# Patient Record
Sex: MALE | Race: WHITE | NOT HISPANIC OR LATINO | Employment: OTHER | URBAN - NONMETROPOLITAN AREA
[De-identification: names, ages, dates, MRNs, and addresses within clinical notes are randomized per-mention and may not be internally consistent; named-entity substitution may affect disease eponyms.]

---

## 2018-03-22 ENCOUNTER — OUTSIDE FACILITY SERVICE (OUTPATIENT)
Dept: CARDIOLOGY | Facility: CLINIC | Age: 63
End: 2018-03-22

## 2018-03-22 PROCEDURE — 78452 HT MUSCLE IMAGE SPECT MULT: CPT | Performed by: INTERNAL MEDICINE

## 2018-03-22 PROCEDURE — 93018 CV STRESS TEST I&R ONLY: CPT | Performed by: INTERNAL MEDICINE

## 2020-01-14 ENCOUNTER — OUTSIDE FACILITY SERVICE (OUTPATIENT)
Dept: CARDIOLOGY | Facility: CLINIC | Age: 65
End: 2020-01-14

## 2020-01-14 PROCEDURE — 93018 CV STRESS TEST I&R ONLY: CPT | Performed by: INTERNAL MEDICINE

## 2020-01-14 PROCEDURE — 78452 HT MUSCLE IMAGE SPECT MULT: CPT | Performed by: INTERNAL MEDICINE

## 2021-01-19 ENCOUNTER — HOSPITAL ENCOUNTER (OUTPATIENT)
Dept: HOSPITAL 79 - LAB | Age: 66
End: 2021-01-19
Payer: MEDICARE

## 2021-01-19 DIAGNOSIS — N40.0: ICD-10-CM

## 2021-01-19 DIAGNOSIS — I10: ICD-10-CM

## 2021-01-19 DIAGNOSIS — E55.9: ICD-10-CM

## 2021-01-19 DIAGNOSIS — N39.0: ICD-10-CM

## 2021-01-19 DIAGNOSIS — Z12.5: Primary | ICD-10-CM

## 2021-01-19 DIAGNOSIS — R53.83: ICD-10-CM

## 2021-01-19 DIAGNOSIS — E78.5: ICD-10-CM

## 2021-01-19 DIAGNOSIS — I48.91: ICD-10-CM

## 2021-01-19 DIAGNOSIS — E11.9: ICD-10-CM

## 2021-01-19 DIAGNOSIS — N41.9: ICD-10-CM

## 2021-01-19 LAB
BUN/CREATININE RATIO: 13 (ref 0–10)
HGB BLD-MCNC: 13 GM/DL (ref 14–17.5)
RED BLOOD COUNT: 4.26 M/UL (ref 4.2–5.5)
WHITE BLOOD COUNT: 9 K/UL (ref 4.5–11)

## 2021-01-19 PROCEDURE — G0103 PSA SCREENING: HCPCS

## 2021-01-21 LAB
CREATININE, URINE: 312.7 MG/DL
MICROALB/CREAT RATIO: 20 (ref 0–29)
THYROXINE (T4): 5.4 UG/DL (ref 4.5–12)
TRIIODOTHYRONINE (T3): 93 NG/DL (ref 71–180)
VITAMIN D, 25-HYDROXY: 12.9 NG/ML (ref 30–100)

## 2021-01-25 ENCOUNTER — OFFICE VISIT (OUTPATIENT)
Dept: UROLOGY | Facility: CLINIC | Age: 66
End: 2021-01-25

## 2021-01-25 VITALS — TEMPERATURE: 98.2 F | BODY MASS INDEX: 27.2 KG/M2 | WEIGHT: 190 LBS | HEIGHT: 70 IN

## 2021-01-25 DIAGNOSIS — N41.0 ACUTE PROSTATITIS: Primary | ICD-10-CM

## 2021-01-25 DIAGNOSIS — R97.20 ELEVATED PROSTATE SPECIFIC ANTIGEN (PSA): ICD-10-CM

## 2021-01-25 PROCEDURE — 99203 OFFICE O/P NEW LOW 30 MIN: CPT | Performed by: UROLOGY

## 2021-01-25 RX ORDER — HYDROCHLOROTHIAZIDE 25 MG/1
12.5 TABLET ORAL DAILY
COMMUNITY

## 2021-01-25 RX ORDER — HYDROXYZINE HYDROCHLORIDE 10 MG/1
TABLET, FILM COATED ORAL
COMMUNITY
Start: 2020-11-01 | End: 2021-01-25

## 2021-01-25 RX ORDER — ALPRAZOLAM 0.5 MG/1
TABLET ORAL
COMMUNITY
Start: 2021-01-11 | End: 2021-01-25

## 2021-01-25 RX ORDER — MONTELUKAST SODIUM 10 MG/1
TABLET ORAL
COMMUNITY
Start: 2021-01-08 | End: 2021-01-25

## 2021-01-25 RX ORDER — LANSOPRAZOLE 30 MG/1
30 CAPSULE, DELAYED RELEASE ORAL DAILY
COMMUNITY
Start: 2020-12-04

## 2021-01-25 RX ORDER — CARVEDILOL 25 MG/1
25 TABLET ORAL 2 TIMES DAILY WITH MEALS
COMMUNITY
Start: 2020-11-21

## 2021-01-25 RX ORDER — PRAVASTATIN SODIUM 40 MG
40 TABLET ORAL DAILY
COMMUNITY

## 2021-01-25 RX ORDER — CITALOPRAM 40 MG/1
40 TABLET ORAL DAILY
COMMUNITY
Start: 2020-12-04

## 2021-01-25 RX ORDER — ERGOCALCIFEROL 1.25 MG/1
50000 CAPSULE ORAL WEEKLY
COMMUNITY

## 2021-01-25 RX ORDER — APIXABAN 5 MG/1
5 TABLET, FILM COATED ORAL EVERY 12 HOURS SCHEDULED
COMMUNITY
Start: 2021-01-20

## 2021-01-25 RX ORDER — ASPIRIN 81 MG/1
81 TABLET, CHEWABLE ORAL DAILY
COMMUNITY

## 2021-01-25 RX ORDER — HYDROCODONE BITARTRATE AND ACETAMINOPHEN 7.5; 325 MG/1; MG/1
1 TABLET ORAL EVERY 4 HOURS PRN
COMMUNITY
Start: 2020-12-11

## 2021-01-25 RX ORDER — TAMSULOSIN HYDROCHLORIDE 0.4 MG/1
1 CAPSULE ORAL DAILY
COMMUNITY
Start: 2021-01-20 | End: 2022-11-09 | Stop reason: SDUPTHER

## 2021-01-25 NOTE — PROGRESS NOTES
Chief Complaint:          Chief Complaint   Patient presents with   • Benign Prostatic Hypertrophy     New pt       HPI:   66 y.o. male referred for evaluation of an enlarged prostate he is currently on Cipro his PSA was 21.3 as checked on 1/19/2021.  This was at the time of a bad urinary tract infection with a pansensitive E. coli.  He gets up twice at night previously on Cipro.  I explained to him that checking a PSA in the face of his severe infection is actually not indicated and creates more problems and is worth.  Otherwise his labs are unremarkable his creatinine is 1.34 his white count is normal.  He has no other complaints or problems.  The PSA is dramatically elevated I will plan to check him back in 6 weeks after is completed a course of ciprofloxacin for what is probable prostatitis      Past Medical History:      History reviewed. No pertinent past medical history.      Current Meds:     Current Outpatient Medications   Medication Sig Dispense Refill   • aspirin 81 MG chewable tablet Chew 81 mg Daily.     • carvedilol (COREG) 25 MG tablet Take 25 mg by mouth 2 (Two) Times a Day With Meals.     • Ciprofloxacin (CIPRO PO) Take  by mouth Every Night.     • citalopram (CeleXA) 40 MG tablet Take 40 mg by mouth Daily.     • Eliquis 5 MG tablet tablet Take 5 mg by mouth Every 12 (Twelve) Hours.     • hydroCHLOROthiazide (HYDRODIURIL) 25 MG tablet Take 25 mg by mouth Daily.     • HYDROcodone-acetaminophen (NORCO) 7.5-325 MG per tablet Take 1 tablet by mouth Every 4 (Four) Hours As Needed.     • lansoprazole (PREVACID) 30 MG capsule Take 30 mg by mouth Daily.     • metFORMIN (GLUCOPHAGE) 1000 MG tablet Take 1,000 mg by mouth 2 (Two) Times a Day With Meals.     • pravastatin (PRAVACHOL) 40 MG tablet Take 40 mg by mouth Daily.     • tamsulosin (FLOMAX) 0.4 MG capsule 24 hr capsule Take 1 capsule by mouth Daily.     • vitamin D (ERGOCALCIFEROL) 1.25 MG (30086 UT) capsule capsule Take 50,000 Units by mouth 1 (One)  Time Per Week.       No current facility-administered medications for this visit.         Allergies:      Allergies   Allergen Reactions   • Contrast Dye Rash        Past Surgical History:     History reviewed. No pertinent surgical history.      Social History:     Social History     Socioeconomic History   • Marital status:      Spouse name: Not on file   • Number of children: Not on file   • Years of education: Not on file   • Highest education level: Not on file       Family History:     History reviewed. No pertinent family history.    Review of Systems:     Review of Systems   Constitutional: Negative.    HENT: Negative.    Eyes: Negative.    Respiratory: Negative.    Cardiovascular: Negative.    Gastrointestinal: Negative.    Endocrine: Negative.    Musculoskeletal: Negative.    Allergic/Immunologic: Negative.    Neurological: Negative.    Hematological: Negative.    Psychiatric/Behavioral: Negative.        Physical Exam:     Physical Exam  Vitals signs and nursing note reviewed.   Constitutional:       Appearance: He is well-developed.   HENT:      Head: Normocephalic and atraumatic.   Eyes:      Conjunctiva/sclera: Conjunctivae normal.      Pupils: Pupils are equal, round, and reactive to light.   Neck:      Musculoskeletal: Normal range of motion.   Cardiovascular:      Rate and Rhythm: Normal rate and regular rhythm.      Heart sounds: Normal heart sounds.   Pulmonary:      Effort: Pulmonary effort is normal.      Breath sounds: Normal breath sounds.   Abdominal:      General: Bowel sounds are normal.      Palpations: Abdomen is soft.   Genitourinary:     Comments: Soft nontender abdomen with no organomegaly, rigidity, or tenderness.  He has normal external genitalia and uncircumcised phallus with a freely movable foreskin bilaterally descended testes without masses there is no inguinal hernias adenopathy or abnormalities he had good rectal tone and a large smooth firm prostate.  There is no  nodularity or any suspicious rectal abnormalities    Musculoskeletal: Normal range of motion.   Skin:     General: Skin is warm and dry.   Neurological:      Mental Status: He is alert and oriented to person, place, and time.      Deep Tendon Reflexes: Reflexes are normal and symmetric.   Psychiatric:         Behavior: Behavior normal.         Thought Content: Thought content normal.         Judgment: Judgment normal.         I have reviewed the following portions of the patient's history: allergies, current medications, past family history, past medical history, past social history, past surgical history, problem list and ROS and confirm it's accurate.      Procedure:       Assessment/Plan:   Elevated prostate specific antigen-we discussed the diagnosis of elevated prostate-specific antigen.  I explained the pathophysiology of PSA.  It is a serine protease that's function in the male reproductive tract is to facilitate the liquefaction of semen.  It is for this reason the body does not want it freely floating in the serum and why typically bound tightly to albumin.  We discussed why we used both a PSA free and total to determine the need for more aggressive therapy I discussed the normal range.  Additionally it was in the range of 1-4 but more recently has been downgraded to something less than 2 or even approaching 1.  I discussed the risk of family history particularly the fact that the average male has a 14% risk of prostate cancer and that in the face of a positive diagnosis in a father it will tablet and any other first-generation relative continued tablet insofar that a father and brother with prostate cancer will produce almost a 50% risk of prostate cancer.  I discussed the use of the temporal use of PSA as the best option for monitoring.  We also discussed the fact that an elevated PSA is an isolated event does not mean that this is prostate cancer and should not engender worry in this regard. I discussed  other things that can elevate PSA including constipation, prostatitis, infection, recent intercourse etc. as well as the risks and benefits associated with this.  Also discussed the fact that this is with a dilutional test and as a consequence of such were present produce slight variations on a single specimen.  Further discussed the risks and benefits of a prostate biopsy as well.  He had a dramatically elevated PSA in the face of severe infection with this repeated should be fine and so is been in the 2 range  Prostatitis-we discussed the differential diagnosis of prostatitis including the National Institutes of Health classification system I through IV.  I discussed that type I prostatitis is  acute bacterial prostatitis and an associated with high fevers typically hematuria and severe pain with voiding.  We discussed the fact that it necessitates 6 weeks of chronic antibiotics to be sure it doesn't turn into a chronic bacterial prostatitis that can have lifelong ramifications.  We discussed National Institutes of Health type II chronic bacterial prostatitis.  We discussed the fact that this a chronic bacterial infection of the prostate that often requires suppressive antibiotics.  We discussed type III being related more to nonspecific urethritis as well as tight for being related to finding inflammation and a biopsy in the absence of symptomatology.  I discussed the diagnostic strategies including the VB 1 through 4 urine culture and discussed empiric therapy.  I emphasized that with the use of chronic antibiotics I strongly recommended the concomitant use of probiotics.  Additionally, we discussed the various antibiotics used and the risks and benefits associated with each particularly the use of long-term ciprofloxacin and the effect on joints and collagen in human beings.  To complete a course of Cipro            Patient's Body mass index is 27.26 kg/m². BMI is above normal parameters. Recommendations  include: educational material.              This document has been electronically signed by STACIE HOLM MD January 25, 2021 15:07 EST

## 2021-01-26 PROBLEM — R97.20 ELEVATED PROSTATE SPECIFIC ANTIGEN (PSA): Status: ACTIVE | Noted: 2021-01-26

## 2021-01-26 PROBLEM — N41.0 ACUTE PROSTATITIS: Status: ACTIVE | Noted: 2021-01-26

## 2021-03-15 ENCOUNTER — TRANSCRIBE ORDERS (OUTPATIENT)
Dept: ADMINISTRATIVE | Facility: HOSPITAL | Age: 66
End: 2021-03-15

## 2021-03-15 DIAGNOSIS — Z01.818 OTHER SPECIFIED PRE-OPERATIVE EXAMINATION: Primary | ICD-10-CM

## 2021-03-17 ENCOUNTER — LAB (OUTPATIENT)
Dept: LAB | Facility: HOSPITAL | Age: 66
End: 2021-03-17

## 2021-03-17 DIAGNOSIS — Z01.818 OTHER SPECIFIED PRE-OPERATIVE EXAMINATION: ICD-10-CM

## 2021-03-17 PROCEDURE — U0004 COV-19 TEST NON-CDC HGH THRU: HCPCS

## 2021-03-17 PROCEDURE — C9803 HOPD COVID-19 SPEC COLLECT: HCPCS

## 2021-03-17 PROCEDURE — U0005 INFEC AGEN DETEC AMPLI PROBE: HCPCS

## 2021-03-18 LAB — SARS-COV-2 RNA NOSE QL NAA+PROBE: NOT DETECTED

## 2021-06-30 ENCOUNTER — HOSPITAL ENCOUNTER (OUTPATIENT)
Dept: HOSPITAL 79 - LAB | Age: 66
End: 2021-06-30
Attending: NURSE PRACTITIONER
Payer: MEDICARE

## 2021-06-30 DIAGNOSIS — E55.9: Primary | ICD-10-CM

## 2021-06-30 DIAGNOSIS — E87.5: ICD-10-CM

## 2021-06-30 LAB
HGB BLD-MCNC: 13.6 GM/DL (ref 14–17.5)
RED BLOOD COUNT: 4.31 M/UL (ref 4.2–5.5)
WHITE BLOOD COUNT: 7.7 K/UL (ref 4.5–11)

## 2021-07-09 ENCOUNTER — HOSPITAL ENCOUNTER (OUTPATIENT)
Dept: HOSPITAL 79 - LAB | Age: 66
End: 2021-07-09
Attending: NURSE PRACTITIONER
Payer: MEDICARE

## 2021-07-09 DIAGNOSIS — E87.5: Primary | ICD-10-CM

## 2021-07-09 LAB — BUN/CREATININE RATIO: 12 (ref 0–10)

## 2021-08-25 ENCOUNTER — HOSPITAL ENCOUNTER (OUTPATIENT)
Dept: HOSPITAL 79 - LAB | Age: 66
End: 2021-08-25
Attending: NURSE PRACTITIONER
Payer: MEDICARE

## 2021-08-25 DIAGNOSIS — N41.9: ICD-10-CM

## 2021-08-25 DIAGNOSIS — Z95.0: ICD-10-CM

## 2021-08-25 DIAGNOSIS — E87.1: ICD-10-CM

## 2021-08-25 DIAGNOSIS — E78.5: ICD-10-CM

## 2021-08-25 DIAGNOSIS — E11.9: Primary | ICD-10-CM

## 2021-08-25 DIAGNOSIS — I10: ICD-10-CM

## 2021-08-25 DIAGNOSIS — E55.9: ICD-10-CM

## 2021-08-25 DIAGNOSIS — N40.1: ICD-10-CM

## 2021-08-25 DIAGNOSIS — I48.91: ICD-10-CM

## 2021-08-25 DIAGNOSIS — E87.5: ICD-10-CM

## 2021-08-25 DIAGNOSIS — J30.2: ICD-10-CM

## 2021-08-25 LAB
BUN/CREATININE RATIO: 11 (ref 0–10)
HGB BLD-MCNC: 13.5 GM/DL (ref 14–17.5)
RED BLOOD COUNT: 4.28 M/UL (ref 4.2–5.5)
WHITE BLOOD COUNT: 6.1 K/UL (ref 4.5–11)

## 2021-08-26 LAB
THYROXINE (T4): 6 UG/DL (ref 4.5–12)
TRIIODOTHYRONINE (T3): 96 NG/DL (ref 71–180)
VITAMIN D, 25-HYDROXY: 38 NG/ML (ref 30–100)

## 2021-09-10 PROBLEM — U07.1 COVID-19: Status: ACTIVE | Noted: 2021-09-10

## 2021-09-10 RX ORDER — DIPHENHYDRAMINE HYDROCHLORIDE 50 MG/ML
50 INJECTION INTRAMUSCULAR; INTRAVENOUS AS NEEDED
Status: CANCELLED | OUTPATIENT
Start: 2021-09-11

## 2021-09-10 RX ORDER — METHYLPREDNISOLONE SODIUM SUCCINATE 125 MG/2ML
125 INJECTION, POWDER, LYOPHILIZED, FOR SOLUTION INTRAMUSCULAR; INTRAVENOUS AS NEEDED
Status: CANCELLED | OUTPATIENT
Start: 2021-09-11

## 2021-09-10 RX ORDER — EPINEPHRINE 1 MG/ML
0.3 INJECTION, SOLUTION INTRAMUSCULAR; SUBCUTANEOUS AS NEEDED
Status: CANCELLED | OUTPATIENT
Start: 2021-09-11

## 2021-09-11 ENCOUNTER — HOSPITAL ENCOUNTER (OUTPATIENT)
Dept: INFUSION THERAPY | Facility: HOSPITAL | Age: 66
Discharge: HOME | End: 2021-09-11
Admitting: NURSE PRACTITIONER

## 2021-09-11 VITALS
RESPIRATION RATE: 20 BRPM | SYSTOLIC BLOOD PRESSURE: 141 MMHG | HEART RATE: 71 BPM | TEMPERATURE: 98.7 F | DIASTOLIC BLOOD PRESSURE: 95 MMHG | OXYGEN SATURATION: 100 %

## 2021-09-11 DIAGNOSIS — U07.1 COVID-19: Primary | ICD-10-CM

## 2021-09-11 PROCEDURE — M0243 CASIRIVI AND IMDEVI INFUSION: HCPCS | Performed by: NURSE PRACTITIONER

## 2021-09-11 PROCEDURE — 25010000006 INJECTION, CASIRIVIMAB AND IMDEVIMAB, 1200 MG: Performed by: NURSE PRACTITIONER

## 2021-09-11 RX ORDER — METHYLPREDNISOLONE SODIUM SUCCINATE 125 MG/2ML
125 INJECTION, POWDER, LYOPHILIZED, FOR SOLUTION INTRAMUSCULAR; INTRAVENOUS AS NEEDED
Status: CANCELLED | OUTPATIENT
Start: 2021-09-11

## 2021-09-11 RX ORDER — EPINEPHRINE 1 MG/ML
0.3 INJECTION, SOLUTION INTRAMUSCULAR; SUBCUTANEOUS AS NEEDED
Status: CANCELLED | OUTPATIENT
Start: 2021-09-11

## 2021-09-11 RX ORDER — DIPHENHYDRAMINE HYDROCHLORIDE 50 MG/ML
50 INJECTION INTRAMUSCULAR; INTRAVENOUS AS NEEDED
Status: DISCONTINUED | OUTPATIENT
Start: 2021-09-11 | End: 2021-09-13 | Stop reason: HOSPADM

## 2021-09-11 RX ORDER — EPINEPHRINE 1 MG/ML
0.3 INJECTION, SOLUTION INTRAMUSCULAR; SUBCUTANEOUS AS NEEDED
Status: DISCONTINUED | OUTPATIENT
Start: 2021-09-11 | End: 2021-09-13 | Stop reason: HOSPADM

## 2021-09-11 RX ORDER — METHYLPREDNISOLONE SODIUM SUCCINATE 125 MG/2ML
125 INJECTION, POWDER, LYOPHILIZED, FOR SOLUTION INTRAMUSCULAR; INTRAVENOUS AS NEEDED
Status: DISCONTINUED | OUTPATIENT
Start: 2021-09-11 | End: 2021-09-13 | Stop reason: HOSPADM

## 2021-09-11 RX ORDER — DIPHENHYDRAMINE HYDROCHLORIDE 50 MG/ML
50 INJECTION INTRAMUSCULAR; INTRAVENOUS AS NEEDED
Status: CANCELLED | OUTPATIENT
Start: 2021-09-11

## 2021-09-11 RX ADMIN — CASIRIVIMAB AND IMDEVIMAB: 600; 600 INJECTION, SOLUTION, CONCENTRATE INTRAVENOUS at 14:36

## 2021-10-21 ENCOUNTER — OUTSIDE FACILITY SERVICE (OUTPATIENT)
Dept: CARDIOLOGY | Facility: CLINIC | Age: 66
End: 2021-10-21

## 2021-10-21 PROCEDURE — 78452 HT MUSCLE IMAGE SPECT MULT: CPT | Performed by: INTERNAL MEDICINE

## 2021-10-21 PROCEDURE — 93018 CV STRESS TEST I&R ONLY: CPT | Performed by: INTERNAL MEDICINE

## 2021-10-22 ENCOUNTER — OUTSIDE FACILITY SERVICE (OUTPATIENT)
Dept: CARDIOLOGY | Facility: CLINIC | Age: 66
End: 2021-10-22

## 2022-02-10 ENCOUNTER — HOSPITAL ENCOUNTER (OUTPATIENT)
Dept: HOSPITAL 79 - LAB | Age: 67
End: 2022-02-10
Attending: NURSE PRACTITIONER
Payer: MEDICARE

## 2022-02-10 DIAGNOSIS — I10: Primary | ICD-10-CM

## 2022-02-10 DIAGNOSIS — E55.9: ICD-10-CM

## 2022-02-10 DIAGNOSIS — E11.9: ICD-10-CM

## 2022-02-10 DIAGNOSIS — R53.83: ICD-10-CM

## 2022-02-10 DIAGNOSIS — E78.5: ICD-10-CM

## 2022-02-10 LAB
BUN/CREATININE RATIO: 10 (ref 0–10)
HGB BLD-MCNC: 13.9 GM/DL (ref 14–17.5)
RED BLOOD COUNT: 4.67 M/UL (ref 4.2–5.5)
WHITE BLOOD COUNT: 7.6 K/UL (ref 4.5–11)

## 2022-02-11 LAB
THYROXINE (T4): 6.2 UG/DL (ref 4.5–12)
TRIIODOTHYRONINE (T3): 111 NG/DL (ref 71–180)

## 2022-02-12 LAB — VITAMIN D, 25-HYDROXY: 31.9 NG/ML (ref 30–100)

## 2022-02-15 ENCOUNTER — HOSPITAL ENCOUNTER (OUTPATIENT)
Dept: HOSPITAL 79 - LAB | Age: 67
End: 2022-02-15
Attending: NURSE PRACTITIONER
Payer: MEDICARE

## 2022-02-15 DIAGNOSIS — E87.5: Primary | ICD-10-CM

## 2022-02-16 LAB
A/G RATIO: 1.6 (ref 1.2–2.2)
ALBUMIN, SERUM: 4.4 G/DL (ref 3.8–4.8)
ALKALINE PHOSPHATASE, S: 62 IU/L (ref 44–121)
ALT (SGPT): 12 IU/L (ref 0–44)
AST (SGOT): 16 IU/L (ref 0–40)
BUN/CREATININE RATIO: 12 (ref 10–24)
BUN: 14 MG/DL (ref 8–27)
CALCIUM, SERUM: 9.9 MG/DL (ref 8.6–10.2)
CARBON DIOXIDE, TOTAL: 22 MMOL/L (ref 20–29)
CHLORIDE, SERUM: 103 MMOL/L (ref 96–106)
EGFR IF AFRICN AM: 73 (ref 59–?)
EGFR IF NONAFRICN AM: 63 (ref 59–?)
GLOBULIN, TOTAL: 2.8 G/DL (ref 1.5–4.5)
GLUCOSE, SERUM: 192 MG/DL (ref 65–99)
POTASSIUM, SERUM: 5.9 MMOL/L (ref 3.5–5.2)
PROTEIN, TOTAL, SERUM: 7.2 G/DL (ref 6–8.5)
SODIUM, SERUM: 141 MMOL/L (ref 134–144)

## 2022-04-05 ENCOUNTER — HOSPITAL ENCOUNTER (OUTPATIENT)
Dept: HOSPITAL 79 - LAB | Age: 67
End: 2022-04-05
Payer: MEDICARE

## 2022-04-05 DIAGNOSIS — E87.5: Primary | ICD-10-CM

## 2022-04-05 DIAGNOSIS — I10: ICD-10-CM

## 2022-04-05 LAB — BUN/CREATININE RATIO: 13 (ref 0–10)

## 2022-11-09 ENCOUNTER — OFFICE VISIT (OUTPATIENT)
Dept: UROLOGY | Facility: CLINIC | Age: 67
End: 2022-11-09

## 2022-11-09 VITALS — WEIGHT: 190 LBS | BODY MASS INDEX: 27.2 KG/M2 | HEIGHT: 70 IN

## 2022-11-09 DIAGNOSIS — N40.1 BENIGN PROSTATIC HYPERPLASIA WITH INCOMPLETE BLADDER EMPTYING: ICD-10-CM

## 2022-11-09 DIAGNOSIS — N41.0 ACUTE PROSTATITIS: ICD-10-CM

## 2022-11-09 DIAGNOSIS — R39.14 BENIGN PROSTATIC HYPERPLASIA WITH INCOMPLETE BLADDER EMPTYING: ICD-10-CM

## 2022-11-09 DIAGNOSIS — N40.0 BPH WITHOUT OBSTRUCTION/LOWER URINARY TRACT SYMPTOMS: ICD-10-CM

## 2022-11-09 DIAGNOSIS — R33.9 INCOMPLETE EMPTYING OF BLADDER: ICD-10-CM

## 2022-11-09 DIAGNOSIS — R35.0 FREQUENCY OF MICTURITION: ICD-10-CM

## 2022-11-09 DIAGNOSIS — N30.01 ACUTE CYSTITIS WITH HEMATURIA: ICD-10-CM

## 2022-11-09 DIAGNOSIS — N39.0 RECURRENT UTI (URINARY TRACT INFECTION): ICD-10-CM

## 2022-11-09 DIAGNOSIS — R97.20 ELEVATED PROSTATE SPECIFIC ANTIGEN (PSA): Primary | ICD-10-CM

## 2022-11-09 LAB
BILIRUB BLD-MCNC: ABNORMAL MG/DL
CLARITY, POC: ABNORMAL
COLOR UR: YELLOW
EXPIRATION DATE: ABNORMAL
GLUCOSE UR STRIP-MCNC: NEGATIVE MG/DL
KETONES UR QL: ABNORMAL
LEUKOCYTE EST, POC: ABNORMAL
Lab: ABNORMAL
NITRITE UR-MCNC: POSITIVE MG/ML
PH UR: 5 [PH] (ref 5–8)
PROT UR STRIP-MCNC: ABNORMAL MG/DL
RBC # UR STRIP: ABNORMAL /UL
SP GR UR: 1.02 (ref 1–1.03)
UROBILINOGEN UR QL: NORMAL

## 2022-11-09 PROCEDURE — 87186 SC STD MICRODIL/AGAR DIL: CPT | Performed by: NURSE PRACTITIONER

## 2022-11-09 PROCEDURE — 87086 URINE CULTURE/COLONY COUNT: CPT | Performed by: NURSE PRACTITIONER

## 2022-11-09 PROCEDURE — 99214 OFFICE O/P EST MOD 30 MIN: CPT | Performed by: NURSE PRACTITIONER

## 2022-11-09 PROCEDURE — 81003 URINALYSIS AUTO W/O SCOPE: CPT | Performed by: NURSE PRACTITIONER

## 2022-11-09 PROCEDURE — 96372 THER/PROPH/DIAG INJ SC/IM: CPT | Performed by: NURSE PRACTITIONER

## 2022-11-09 PROCEDURE — 87077 CULTURE AEROBIC IDENTIFY: CPT | Performed by: NURSE PRACTITIONER

## 2022-11-09 RX ORDER — CIPROFLOXACIN 500 MG/1
500 TABLET, FILM COATED ORAL 2 TIMES DAILY
Qty: 20 TABLET | Refills: 0 | Status: SHIPPED | OUTPATIENT
Start: 2022-11-09 | End: 2022-11-19

## 2022-11-09 RX ORDER — ALPRAZOLAM 0.5 MG/1
0.5 TABLET ORAL 2 TIMES DAILY PRN
COMMUNITY

## 2022-11-09 RX ORDER — POLYETHYLENE GLYCOL 3350 17 G/17G
17 POWDER, FOR SOLUTION ORAL DAILY
Qty: 30 EACH | Refills: 3 | Status: SHIPPED | OUTPATIENT
Start: 2022-11-09

## 2022-11-09 RX ORDER — PROMETHAZINE HYDROCHLORIDE 25 MG/1
25 TABLET ORAL EVERY 12 HOURS PRN
Qty: 20 TABLET | Refills: 0 | Status: SHIPPED | OUTPATIENT
Start: 2022-11-09

## 2022-11-09 RX ORDER — GENTAMICIN SULFATE 40 MG/ML
80 INJECTION, SOLUTION INTRAMUSCULAR; INTRAVENOUS ONCE
Status: COMPLETED | OUTPATIENT
Start: 2022-11-09 | End: 2022-11-09

## 2022-11-09 RX ORDER — TAMSULOSIN HYDROCHLORIDE 0.4 MG/1
1 CAPSULE ORAL DAILY
Qty: 30 CAPSULE | Refills: 5 | Status: SHIPPED | OUTPATIENT
Start: 2022-11-09

## 2022-11-09 RX ORDER — LOSARTAN POTASSIUM 25 MG/1
25 TABLET ORAL DAILY
COMMUNITY

## 2022-11-09 RX ADMIN — GENTAMICIN SULFATE 80 MG: 40 INJECTION, SOLUTION INTRAMUSCULAR; INTRAVENOUS at 14:19

## 2022-11-09 NOTE — PROGRESS NOTES
Chief Complaint  BPH WITH ELEVATED PSA/CHRONIC PROSTATITIS/UTI (FOLLOW UP BPH WITH ELEVATED PSA 7.7/UTI/PROSTATITIS)    Dwight Zamora presents to [facility] for  BPH WITH ELEVATED PSA/LUTS  History of Present Illness    Mr. Louie Zamora is a pleasant 67-year-old male who returns to clinic today for evaluation. This is his yearly follow-up for BPH with elevated PSA. Patient was last seen in clinic in 1/2021 by Dr. Roldan during his initial evaluation, he was a new patient consult for BPH with elevated PSA of 21.3, checked on 01/19/2021     During that evaluation, patient was noted to have a very bad UTI infection that was pan sensitive for E. coli. He reported urinary symptoms of nocturia, urine frequency, urgency, perineal pain and discomfort that was also consistent with prostatitis. He was placed on ciprofloxacin and was to follow up post infection for urine recheck. He has been lost to follow up until recently.     He returns to clinic today for a new PSA level of 7.7 that was done last month on 10/6/2022, again post another kidney/urinary tract infection, for which he has completed another antibiotic therapy.  He is asymptomatic today and denies any bothersome urinary symptoms such as burning with urination, dysuria, perineal pain or discomfort.  Nevertheless, his urine dipstick today still show 3+ leukocyte esterase, it is positive for nitrites, it shows trace microscopic hematuria and 1+ proteinuria. His PVR however is 0 ml.IPSS SCORE IS 11    He had a kidney infection in 1/2022 and a few week ago, so he was placed on antibiotics. He states when he had a kidney infection the first time he had a fever, but this time he did not. He denies any frequency, urgency and burning with urination. He has some back pain. He denies any urinary incontinence. He denies any pain with bowel movements or hematuria. He denies any nausea or vomiting. He denies any  "nocturia.  He denies any perineal pain or discomfort.    He denies any side effects from his use of Cipro the last time.  He denies any use of medication for his prostate. he confirms the use of Plavix and Eliquis.    He states he had open heart surgery in 5/2022 and he received a heart catheter. 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       Objective   Vital Signs:   Ht 177.8 cm (70\")   Wt 86.2 kg (190 lb)   BMI 27.26 kg/m²       ROS:   Review of Systems   Constitutional: Negative for activity change, appetite change, diaphoresis, fatigue, fever, unexpected weight gain and unexpected weight loss.   HENT: Negative for congestion, ear discharge, ear pain, nosebleeds, rhinorrhea, sinus pressure and sore throat.    Eyes: Negative for blurred vision, double vision, photophobia, pain, redness and visual disturbance.   Respiratory: Negative for apnea, cough, chest tightness, shortness of breath, wheezing and stridor.    Cardiovascular: Negative for chest pain and palpitations.   Gastrointestinal: Negative for abdominal distention, abdominal pain, constipation, diarrhea, nausea and vomiting.   Endocrine: Negative for polydipsia, polyphagia and polyuria.   Genitourinary: Positive for difficulty urinating. Negative for decreased urine volume, dysuria, flank pain, frequency, hematuria, urgency and urinary incontinence.   Musculoskeletal: Positive for back pain. Negative for arthralgias and joint swelling.   Skin: Negative for pallor, rash and wound.   Neurological: Negative for dizziness, tremors, syncope, weakness, light-headedness, memory problem and confusion.   Psychiatric/Behavioral: Negative for behavioral problems.        Physical Exam  Constitutional:       General: He is in acute distress.      Appearance: He is well-developed. He is ill-appearing.   HENT:      Head: Normocephalic and atraumatic.      Right Ear: External ear normal.      Left Ear: External ear " normal.   Eyes:      General:         Right eye: No discharge.         Left eye: No discharge.      Conjunctiva/sclera: Conjunctivae normal.      Pupils: Pupils are equal, round, and reactive to light.   Neck:      Thyroid: No thyromegaly.      Trachea: No tracheal deviation.   Cardiovascular:      Rate and Rhythm: Normal rate and regular rhythm.      Heart sounds: No murmur heard.    No friction rub.   Pulmonary:      Effort: Pulmonary effort is normal. No respiratory distress.      Breath sounds: Normal breath sounds. No stridor.   Abdominal:      General: Bowel sounds are normal. There is distension.      Palpations: Abdomen is soft.      Tenderness: There is abdominal tenderness. There is no guarding.   Genitourinary:     Penis: Normal and uncircumcised. No tenderness or discharge.       Testes: Normal.      Rectum: Normal. Guaiac result negative.      Comments: DEFERRED KIM, WILL EVALUATE POST ABX  Musculoskeletal:         General: Tenderness present. No deformity. Normal range of motion.      Cervical back: Normal range of motion and neck supple.   Skin:     General: Skin is warm and dry.      Capillary Refill: Capillary refill takes less than 2 seconds.      Coloration: Skin is not pale.   Neurological:      Mental Status: He is alert and oriented to person, place, and time.      Cranial Nerves: No cranial nerve deficit.      Motor: Weakness present.      Coordination: Coordination normal.   Psychiatric:         Behavior: Behavior normal.         Thought Content: Thought content normal.         Judgment: Judgment normal.        Result Review :     UA    Urinalysis 11/9/22   Ketones, UA Trace (A)   Leukocytes, UA Moderate (2+) (A)   (A) Abnormal value                     Assessment and Plan    Problem List Items Addressed This Visit        Genitourinary and Reproductive     Elevated prostate specific antigen (PSA) - Primary    Relevant Medications    tamsulosin (FLOMAX) 0.4 MG capsule 24 hr capsule    Other  Relevant Orders    PSA Total+% Free (Serial)    Acute prostatitis    Relevant Medications    ciprofloxacin (Cipro) 500 MG tablet    polyethylene glycol (MiraLax) 17 g packet    promethazine (PHENERGAN) 25 MG tablet    Other Relevant Orders    PSA Total+% Free (Serial)    BPH without obstruction/lower urinary tract symptoms    Relevant Medications    polyethylene glycol (MiraLax) 17 g packet    promethazine (PHENERGAN) 25 MG tablet    tamsulosin (FLOMAX) 0.4 MG capsule 24 hr capsule    Other Relevant Orders    PSA Total+% Free (Serial)   Other Visit Diagnoses     Benign prostatic hyperplasia with incomplete bladder emptying        Relevant Medications    polyethylene glycol (MiraLax) 17 g packet    promethazine (PHENERGAN) 25 MG tablet    tamsulosin (FLOMAX) 0.4 MG capsule 24 hr capsule    Other Relevant Orders    PSA Total+% Free (Serial)    Frequency of micturition        Relevant Medications    ciprofloxacin (Cipro) 500 MG tablet    polyethylene glycol (MiraLax) 17 g packet    promethazine (PHENERGAN) 25 MG tablet    tamsulosin (FLOMAX) 0.4 MG capsule 24 hr capsule    Other Relevant Orders    Urine Culture - Urine, Urine, Clean Catch    Incomplete emptying of bladder        Relevant Medications    ciprofloxacin (Cipro) 500 MG tablet    polyethylene glycol (MiraLax) 17 g packet    promethazine (PHENERGAN) 25 MG tablet    tamsulosin (FLOMAX) 0.4 MG capsule 24 hr capsule    Recurrent UTI (urinary tract infection)        Relevant Medications    ciprofloxacin (Cipro) 500 MG tablet    polyethylene glycol (MiraLax) 17 g packet    promethazine (PHENERGAN) 25 MG tablet    Acute cystitis with hematuria        Relevant Medications    ciprofloxacin (Cipro) 500 MG tablet    polyethylene glycol (MiraLax) 17 g packet    promethazine (PHENERGAN) 25 MG tablet    tamsulosin (FLOMAX) 0.4 MG capsule 24 hr capsule                   BPH WITH Elevated Prostate Specific /LUTS/Antigen/Acute cystitis-   BPH: WE Discussed the  pathophysiology of BPH and obstruction. We discussed the static and dynamic effects of BPH as well as using 5 alpha reductase inhibitors versus alpha blockade.  We discussed the indications for transurethral surgery as well.  And/ or other therapeutic options available including all of the newer techniques.  He has no real symptomatology referable to his prostate other than moderate enlargement.  Rather than proceed with an expensive workup he doesn't need, at this time I am recommending an alpha blocker tamsulosin 0.4 mg capsule daily, ALSO FINASTERIDE IF PSA IS WNL    WE discussed the diagnosis of elevated prostate-specific antigen.  I explained the pathophysiology of PSA. AS A serine protease, and that its function in the male reproductive tract is to facilitate the liquefaction of semen.  It is for this reason the body does not want it freely floating in the serum and why it typically bbinds tightly to albumin.  We discussed why we used both a PSA free and total to determine the need for more aggressive therapy I discussed the normal range.      Additionally it was in the range of 1-4 but more recently has been downgraded to something less than 2 or even approaching 1.  I discussed the risk of family history particularly the fact that the average male has a 14% risk of prostate cancer and that in the face of a positive diagnosis in a father it will tablet and any other first-generation relative continued tablet insofar that a father and brother with prostate cancer will produce almost a 50% risk of prostate cancer.      I also discussed the temporal use of PSA as the best option for monitoring. We also discussed the fact that an elevated PSA is an isolated event does not mean that this is prostate cancer and should not engender worry in this regard. I discussed other things that can elevate PSA including constipation, prostatitis, infection, recent intercourse, horseback riding etc. as well as the risks and  benefits associated with this.  Also discussed the fact that this is with a dilutional test and as a consequence of such were present produce slight variations on a single specimen.     We discussed the differential diagnosis of prostatitis including the National Institutes of Health classification system I through IV.  I discussed that type I prostatitis as  acute bacterial prostatitis and an associated with high fevers typically hematuria and severe pain with voiding. We discussed the fact that it necessitates 6 weeks of chronic antibiotics to be sure it doesn't turn into a chronic bacterial prostatitis that can have lifelong ramifications.      We discussed National Institutes of Health type II chronic bacterial prostatitis.  We discussed the fact that this a chronic bacterial infection of the prostate that often requires suppressive antibiotics.   We discussed type III being related more to nonspecific urethritis as well as tight for being related to finding inflammation and a biopsy in the absence of symptomatology.  I discussed the diagnostic strategies including the VB 1 through 4 urine culture and discussed empiric therapy.I emphasized that with the use of chronic antibiotics I strongly recommended the concomitant use of probiotics.  Additionally, we discussed the various antibiotics used and the risks and benefits associated with each particularly the use of long-term ciprofloxacin and the effect on joints and collagen in human beings     PLAN  We sent his urine for culture, I will call him with results if any positive bacterial growth.    Gentamicin 80 mg IM x1 dose given in clinic today.    Will start ciprofloxacin 500 mg p.o. twice daily x10 days-CHRONIC Prostatitis     Further discussed the risks and benefits of a prostate biopsy as well if PSA is positive and indicated.      WE WILL Rechecked HIS  PSA free and total IN TWO WEEKS POST ABX THERAPY- Pending results for definitive plan of  care.    Discussed some home remedies for acute prostatitis and encouraged patient to engage in taking warm showers or baths as tolerated for comfort, avoiding activities that put pressure on the prostate such as bicycling, sitting on hard surfaces for longer.  Encourage sitting on a donut or  cushion, avoiding bladder irritants such as caffeine, alcohol, reducing or avoiding consumption of spicy foods, and increasing p.o. fluid intake to at least 2 to 3 L of water daily    Patient is agreeable plan.    Patient reports that he is not currently experiencing any symptoms of urinary incontinence.    BMI is >= 25 and <30. (Overweight) The following options were offered after discussion;: weight loss educational material (shared in after visit summary), exercise counseling/recommendations and nutrition counseling/recommendations      RADIOLOGY (CT AND/OR KUB):    CT Abdomen and Pelvis: No results found for this or any previous visit.     CT Stone Protocol: No results found for this or any previous visit.     KUB: No results found for this or any previous visit.       LABS (3 MONTHS):    Office Visit on 11/09/2022   Component Date Value Ref Range Status   • Color 11/09/2022 Yellow  Yellow, Straw, Dark Yellow, Indu Final   • Clarity, UA 11/09/2022 Cloudy (A)  Clear Final   • Specific Gravity  11/09/2022 1.025  1.005 - 1.030 Final   • pH, Urine 11/09/2022 5.0  5.0 - 8.0 Final   • Leukocytes 11/09/2022 Moderate (2+) (A)  Negative Final   • Nitrite, UA 11/09/2022 Positive (A)  Negative Final   • Protein, POC 11/09/2022 1+ (A)  Negative mg/dL Final   • Glucose, UA 11/09/2022 Negative  Negative mg/dL Final   • Ketones, UA 11/09/2022 Trace (A)  Negative Final   • Urobilinogen, UA 11/09/2022 Normal  Normal, 0.2 E.U./dL Final   • Bilirubin 11/09/2022 Small (1+) (A)  Negative Final   • Blood, UA 11/09/2022 Trace (A)  Negative Final   • Lot Number 11/09/2022 [id number]   Final   • Expiration Date 11/09/2022 2/8/24   Final       Smoking Cessation Counseling:  Former smoker.CHEWS TOBACCO  Patient refuses to stop tobacco use and refuses counseling. I advised patient to quit tobacco use and offered support.  I provided patient with tobacco cessation educational material printed in the patient's After Visit Summary.     Follow Up   Return in about 4 weeks (around 12/7/2022) for Next scheduled follow up, FOR CHRONIC PROSTATITIS/UTI/ BPH WITH LUTS/PSA/MED REFILLS/KIM.    Patient was given instructions and counseling regarding his condition or for health maintenance advice. Please see specific information pulled into the AVS if appropriate.          This document has been electronically signed by Griselda Cheng-Akwa, APRN   November 9, 2022 20:12 EST      Dictated Utilizing Dragon Dictation: Part of this note may be an electronic transcription/translation of spoken language to printed text using the Dragon Dictation System.     Transcribed from ambient dictation for Griselda Cheng-Akwa, APRN by Indu Norris.  11/09/22   15:38 EST    Patient or patient representative verbalized consent to the visit recording.

## 2022-11-11 ENCOUNTER — TELEPHONE (OUTPATIENT)
Dept: UROLOGY | Facility: CLINIC | Age: 67
End: 2022-11-11

## 2022-11-11 DIAGNOSIS — N30.01 ACUTE CYSTITIS WITH HEMATURIA: ICD-10-CM

## 2022-11-11 DIAGNOSIS — N41.0 ACUTE PROSTATITIS: Primary | ICD-10-CM

## 2022-11-11 DIAGNOSIS — A49.8 BACTERIAL INFECTION DUE TO KLEBSIELLA PNEUMONIAE: ICD-10-CM

## 2022-11-11 DIAGNOSIS — N39.0 RECURRENT UTI (URINARY TRACT INFECTION): ICD-10-CM

## 2022-11-11 LAB — BACTERIA SPEC AEROBE CULT: ABNORMAL

## 2022-11-11 RX ORDER — SULFAMETHOXAZOLE AND TRIMETHOPRIM 800; 160 MG/1; MG/1
1 TABLET ORAL 2 TIMES DAILY
Qty: 56 TABLET | Refills: 0 | Status: SHIPPED | OUTPATIENT
Start: 2022-11-11 | End: 2022-12-09

## 2022-11-11 NOTE — TELEPHONE ENCOUNTER
----- Message from Griselda Cheng-Akwa, APRN sent at 11/11/2022 12:39 PM EST -----  Please kindly call patient with urine culture results positive.  He is currently on ciprofloxacin tell him to complete medications as prescribed.  But after that, he needs to  Bactrim from his pharmacy and take as directed for prostatitis.  He should follow-up in clinic as scheduled on 12/07/2022.  Thank

## 2022-12-07 ENCOUNTER — OFFICE VISIT (OUTPATIENT)
Dept: UROLOGY | Facility: CLINIC | Age: 67
End: 2022-12-07

## 2022-12-07 VITALS — BODY MASS INDEX: 24.91 KG/M2 | WEIGHT: 174 LBS | HEIGHT: 70 IN

## 2022-12-07 DIAGNOSIS — R97.20 ELEVATED PROSTATE SPECIFIC ANTIGEN (PSA): ICD-10-CM

## 2022-12-07 DIAGNOSIS — N40.0 BPH WITHOUT OBSTRUCTION/LOWER URINARY TRACT SYMPTOMS: ICD-10-CM

## 2022-12-07 DIAGNOSIS — N39.0 RECURRENT UTI (URINARY TRACT INFECTION): Primary | ICD-10-CM

## 2022-12-07 LAB
BILIRUB BLD-MCNC: NEGATIVE MG/DL
CLARITY, POC: CLEAR
COLOR UR: YELLOW
EXPIRATION DATE: NORMAL
GLUCOSE UR STRIP-MCNC: NEGATIVE MG/DL
KETONES UR QL: NEGATIVE
LEUKOCYTE EST, POC: NEGATIVE
Lab: NORMAL
NITRITE UR-MCNC: NEGATIVE MG/ML
PH UR: 5.5 [PH] (ref 5–8)
PROT UR STRIP-MCNC: NEGATIVE MG/DL
RBC # UR STRIP: NEGATIVE /UL
SP GR UR: 1.01 (ref 1–1.03)
UROBILINOGEN UR QL: NORMAL

## 2022-12-07 PROCEDURE — 99214 OFFICE O/P EST MOD 30 MIN: CPT | Performed by: NURSE PRACTITIONER

## 2022-12-07 PROCEDURE — 84153 ASSAY OF PSA TOTAL: CPT | Performed by: NURSE PRACTITIONER

## 2022-12-07 PROCEDURE — 87086 URINE CULTURE/COLONY COUNT: CPT | Performed by: NURSE PRACTITIONER

## 2022-12-07 PROCEDURE — 81003 URINALYSIS AUTO W/O SCOPE: CPT | Performed by: NURSE PRACTITIONER

## 2022-12-07 PROCEDURE — 84154 ASSAY OF PSA FREE: CPT | Performed by: NURSE PRACTITIONER

## 2022-12-07 PROCEDURE — 36415 COLL VENOUS BLD VENIPUNCTURE: CPT | Performed by: NURSE PRACTITIONER

## 2022-12-07 NOTE — PROGRESS NOTES
"Chief Complaint  BPH WITH ELEVATED PSA/CHRONIC PROSTATITIS/UTI (FOLLOW UP BP    Subjective          Louie Zamora presents to [facility] for   History of Present Illness     MR. Louie Zamora is a very pleasant 67-year-old male who returns to clinic today for evaluation. This is his 2 weeks follow-up for urine culture, positive for Klebsiella pneumoniae.  On 11/9/22  Urine Culture >100,000 CFU/mL Klebsiella pneumoniae ssp pneumoniae Abnormal       ,  He has been on antibiotic therapy for his infection starting up with ciprofloxacin and then transition to Bactrim DS, and is here today for recheck.  Patient reports tolerating his medications well, he denies any side effects from his antibiotics.  On initial evaluation, his repeat urine today is completely negative for any infection, it is negative for gross AND microscopic hematuria. PVR IS O ML    Patient actually reports feeling significantly better. When he was initially evaluated in clinic on 11/09 of 2022 for his yearly follow-up of BPH, his most recent PSA was 7.7 and that was done on 10/06 of 2022. His PSA prior to that was 21.3, that was back in 2021. His urine dipstick showed 3+ leukocyte esterase and was also positive for nitrites with microscopic hematuria. Patient for some reason has had recurrent UTIs, treated with ciprofloxacin, Bactrim, and doxycycline in the past.    Mr. Zamora is feeling better. He denies any burning, frequency, urgency or any pain while sitting. He denies having prostatitis and reports having a UTI approximately 1 year ago around this same time. He is currently taking ciprofloxacin which he has been treated with for 10 days and he is continuing with Bactrim.     THE REST OF HIS PMHX AS NOTED IN CHART    Objective   Vital Signs:   Ht 177.8 cm (70\")   Wt 78.9 kg (174 lb)   BMI 24.97 kg/m²       ROS:   Review of Systems   Constitutional: Positive for activity change. Negative for appetite " change, chills, diaphoresis, fatigue, fever, unexpected weight gain and unexpected weight loss.   HENT: Negative for congestion, ear discharge, ear pain, nosebleeds, rhinorrhea, sinus pressure and sore throat.    Eyes: Negative for blurred vision, double vision, photophobia, pain, redness and visual disturbance.   Respiratory: Positive for shortness of breath. Negative for apnea, cough, chest tightness, wheezing and stridor.    Cardiovascular: Negative for chest pain, palpitations and leg swelling.   Gastrointestinal: Negative for abdominal distention, abdominal pain, constipation, diarrhea, nausea and vomiting.   Endocrine: Negative for polydipsia, polyphagia and polyuria.   Genitourinary: Positive for flank pain and frequency. Negative for decreased urine volume, difficulty urinating, discharge, dysuria, genital sores, hematuria, penile pain, penile swelling, scrotal swelling, testicular pain, urgency and urinary incontinence.   Musculoskeletal: Positive for back pain and myalgias. Negative for arthralgias and joint swelling.   Skin: Positive for dry skin and pallor. Negative for rash and wound.   Neurological: Negative for dizziness, tremors, syncope, weakness, light-headedness, headache, memory problem and confusion.   Psychiatric/Behavioral: Positive for sleep disturbance and stress. Negative for agitation, behavioral problems and decreased concentration.        Physical Exam  Constitutional:       General: He is in acute distress.      Appearance: He is well-developed.   HENT:      Head: Normocephalic and atraumatic.      Right Ear: External ear normal.      Left Ear: External ear normal.   Eyes:      General:         Right eye: No discharge.         Left eye: No discharge.      Conjunctiva/sclera: Conjunctivae normal.      Pupils: Pupils are equal, round, and reactive to light.   Neck:      Thyroid: No thyromegaly.      Trachea: No tracheal deviation.   Cardiovascular:      Rate and Rhythm: Normal rate and  regular rhythm.      Heart sounds: No murmur heard.    No friction rub.   Pulmonary:      Effort: Pulmonary effort is normal. No respiratory distress.      Breath sounds: Normal breath sounds. No stridor.   Abdominal:      General: Bowel sounds are normal. There is no distension.      Palpations: Abdomen is soft.      Tenderness: There is abdominal tenderness. There is no guarding.   Genitourinary:     Penis: Normal and uncircumcised. No tenderness or discharge.       Testes: Normal.      Rectum: Normal. Guaiac result negative.   Musculoskeletal:         General: Tenderness present. No deformity. Normal range of motion.      Cervical back: Normal range of motion and neck supple.   Skin:     General: Skin is warm and dry.      Capillary Refill: Capillary refill takes less than 2 seconds.      Coloration: Skin is pale.   Neurological:      Mental Status: He is alert and oriented to person, place, and time.      Cranial Nerves: No cranial nerve deficit.      Coordination: Coordination normal.   Psychiatric:         Behavior: Behavior normal.         Thought Content: Thought content normal.         Judgment: Judgment normal.        Result Review :     UA    Urinalysis 11/9/22 12/7/22   Ketones, UA Trace (A) Negative   Leukocytes, UA Moderate (2+) (A) Negative   (A) Abnormal value            Urine Culture    Urine Culture 11/9/22 12/7/22   Urine Culture >100,000 CFU/mL Klebsiella pneumoniae ssp pneumoniae (A) No growth (P)   (A) Abnormal value                     Assessment and Plan    Problem List Items Addressed This Visit        Genitourinary and Reproductive     Elevated prostate specific antigen (PSA)    Relevant Orders    PSA Total+% Free (Serial)    BPH without obstruction/lower urinary tract symptoms   Other Visit Diagnoses     Recurrent UTI (urinary tract infection)    -  Primary          BPH WITH Elevated Prostate Specific /LUTS/Antigen/Acute cystitis WITH HEMAT  MR. Louie Zamora is a very pleasant 67-year-old  male who returns to clinic today for evaluation. This is his 2 weeks follow-up for PRIOR urine culture, positive for Klebsiella pneumoniae.   Urine Culture >100,000 CFU/mL Klebsiella pneumoniae ssp pneumoniae Abnormal       He has been on antibiotic therapy for his infection to ciprofloxacin x10 days, and then transition to Bactrim.  And is here today for recheck. On initial evaluation, his repeat urine today is completely negative for any infection, it is negative for gross AND microscopic hematuria. PVR IS O ML    BPH: WE Discussed the pathophysiology of BPH and obstruction. We discussed the static and dynamic effects of BPH as well as using 5 alpha reductase inhibitors versus alpha blockade.  We discussed the indications for transurethral surgery as well.  And/ or other therapeutic options available including all of the newer techniques.  He has no real symptomatology referable to his prostate other than moderate enlargement.  Rather than proceed with an expensive workup he doesn't need, at this time I am recommending an alpha blocker tamsulosin 0.4 mg capsule daily, ALSO FINASTERIDE IF PSA IS WNL     WE discussed the diagnosis of elevated prostate-specific antigen.  I explained the pathophysiology of PSA. AS A serine protease, and that its function in the male reproductive tract is to facilitate the liquefaction of semen.  It is for this reason the body does not want it freely floating in the serum and why it typically bbinds tightly to albumin.  We discussed why we used both a PSA free and total to determine the need for more aggressive therapy I discussed the normal range.       Additionally it was in the range of 1-4 but more recently has been downgraded to something less than 2 or even approaching 1.  I discussed the risk of family history particularly the fact that the average male has a 14% risk of prostate cancer and that in the face of a positive diagnosis in a father it will tablet and any other  first-generation relative continued tablet insofar that a father and brother with prostate cancer will produce almost a 50% risk of prostate cancer.       I also discussed the temporal use of PSA as the best option for monitoring. We also discussed the fact that an elevated PSA is an isolated event does not mean that this is prostate cancer and should not engender worry in this regard. I discussed other things that can elevate PSA including constipation, prostatitis, infection, recent intercourse, horseback riding etc. as well as the risks and benefits associated with this.  Also discussed the fact that this is with a dilutional test and as a consequence of such were present produce slight variations on a single specimen.      We discussed the differential diagnosis of prostatitis including the National Institutes of Health classification system I through IV.  I discussed that type I prostatitis as  acute bacterial prostatitis and an associated with high fevers typically hematuria and severe pain with voiding. We discussed the fact that it necessitates 6 weeks of chronic antibiotics to be sure it doesn't turn into a chronic bacterial prostatitis that can have lifelong ramifications.       We discussed National Institutes of Health type II chronic bacterial prostatitis.  We discussed the fact that this a chronic bacterial infection of the prostate that often requires suppressive antibiotics. We discussed type III being related more to nonspecific urethritis as well as tight for being related to finding inflammation and a biopsy in the absence of symptomatology.  I discussed the diagnostic strategies including the VB 1 through 4 urine culture and discussed empiric therapy.I emphasized that with the use of chronic antibiotics I strongly recommended the concomitant use of probiotics.  Additionally, we discussed the various antibiotics used and the risks and benefits associated with each particularly the use of long-term  ciprofloxacin and the effect on joints and collagen in human beings                                      PLAN  We sent his urine for culture, I will call him with results if any positive bacterial growth.     Will CONTINUE CURRENT THERAPY WITH BACTRIM DS UNTIL FINISHED.      Further discussed the risks and benefits of a prostate biopsy as well if PSA is positive and indicated.       WE WILL Rechecked HIS  PSA free and total TODAY, I WILL CALL WITH RESULTS  NOW pending for definitive plan of care.      Discussed some home remedies for acute prostatitis and encouraged patient to engage in taking warm showers or baths as tolerated for comfort, avoiding activities that put pressure on the prostate such as bicycling, sitting on hard surfaces for longer.  Encourage sitting on a donut or  cushion, avoiding bladder irritants such as caffeine, alcohol, reducing or avoiding consumption of spicy foods, and increasing p.o. fluid intake to at least 2 to 3 L of water daily     Patient is agreeable plan.    Patient reports that he is not currently experiencing any symptoms of urinary incontinence.    Recheck his PSA post his antibiotic therapy and then we will go from there.    If the patient's lab work comes back elevated, recommend a MRI of the prostate. If PSA is good, will see the patient in 1 year, unless problems occur.    BMI is >= 25 and <30. (Overweight) The following options were offered after discussion;: weight loss educational material (shared in after visit summary), exercise counseling/recommendations and nutrition counseling/recommendations      RADIOLOGY (CT AND/OR KUB):    CT Abdomen and Pelvis: No results found for this or any previous visit.     CT Stone Protocol: No results found for this or any previous visit.     KUB: No results found for this or any previous visit.       LABS (3 MONTHS):    Office Visit on 12/07/2022   Component Date Value Ref Range Status   • Color 12/07/2022 Yellow  Yellow, Straw, Dark  Yellow, Indu Final   • Clarity, UA 12/07/2022 Clear  Clear Final   • Specific Gravity  12/07/2022 1.015  1.005 - 1.030 Final   • pH, Urine 12/07/2022 5.5  5.0 - 8.0 Final   • Leukocytes 12/07/2022 Negative  Negative Final   • Nitrite, UA 12/07/2022 Negative  Negative Final   • Protein, POC 12/07/2022 Negative  Negative mg/dL Final   • Glucose, UA 12/07/2022 Negative  Negative mg/dL Final   • Ketones, UA 12/07/2022 Negative  Negative Final   • Urobilinogen, UA 12/07/2022 Normal  Normal, 0.2 E.U./dL Final   • Bilirubin 12/07/2022 Negative  Negative Final   • Blood, UA 12/07/2022 Negative  Negative Final   • Lot Number 12/07/2022 [id number]   Final   • Expiration Date 12/07/2022 122,[id number]   Final   • Urine Culture 12/07/2022 No growth   Preliminary   Office Visit on 11/09/2022   Component Date Value Ref Range Status   • Color 11/09/2022 Yellow  Yellow, Straw, Dark Yellow, Indu Final   • Clarity, UA 11/09/2022 Cloudy (A)  Clear Final   • Specific Gravity  11/09/2022 1.025  1.005 - 1.030 Final   • pH, Urine 11/09/2022 5.0  5.0 - 8.0 Final   • Leukocytes 11/09/2022 Moderate (2+) (A)  Negative Final   • Nitrite, UA 11/09/2022 Positive (A)  Negative Final   • Protein, POC 11/09/2022 1+ (A)  Negative mg/dL Final   • Glucose, UA 11/09/2022 Negative  Negative mg/dL Final   • Ketones, UA 11/09/2022 Trace (A)  Negative Final   • Urobilinogen, UA 11/09/2022 Normal  Normal, 0.2 E.U./dL Final   • Bilirubin 11/09/2022 Small (1+) (A)  Negative Final   • Blood, UA 11/09/2022 Trace (A)  Negative Final   • Lot Number 11/09/2022 [id number]   Final   • Expiration Date 11/09/2022 2/8/24   Final   • Urine Culture 11/09/2022 >100,000 CFU/mL Klebsiella pneumoniae ssp pneumoniae (A)   Final          Smoking Cessation Counseling:  Former smoker.CURRENT CHEWS TOBACCO  Patient refuses to stop tobacco use and refuses counseling.    Follow Up   Return in about 1 year (around 12/8/2023) for Next scheduled follow up, FOR BPH WITH LUTS/PSA/MED  REFILLS/KIM, RECURRENT UTI/DYSURIA.    Patient was given instructions and counseling regarding his condition or for health maintenance advice. Please see specific information pulled into the AVS if appropriate.          This document has been electronically signed by Griselda Cheng-Akwa, APRN   December 8, 2022 19:25 EST      Dictated Utilizing Dragon Dictation: Part of this note may be an electronic transcription/translation of spoken language to printed text using the Dragon Dictation System.    Transcribed from ambient dictation for Griselda Cheng-Akwa, APRN by Richar Woods.  12/07/22   15:43 EST    Patient or patient representative verbalized consent to the visit recording.  I have personally performed the services described in this document as transcribed by the above individual, and it is both accurate and complete.  Griselda Cheng-Akwa, APRN  12/8/2022  19:25 EST

## 2022-12-08 LAB — BACTERIA SPEC AEROBE CULT: NO GROWTH

## 2022-12-09 LAB
PSA FREE MFR SERPL: 8 %
PSA FREE SERPL-MCNC: 0.08 NG/ML
PSA SERPL-MCNC: 1 NG/ML (ref 0–4)

## 2022-12-12 ENCOUNTER — TELEPHONE (OUTPATIENT)
Dept: UROLOGY | Facility: CLINIC | Age: 67
End: 2022-12-12

## 2022-12-12 NOTE — TELEPHONE ENCOUNTER
I called the pt and let them know    ----- Message from Griselda Cheng-Akwa, APRN sent at 12/9/2022  2:12 PM EST -----  please kindly let patient know PSA results unremarkable at this time.  Follow-up in clinic as discussed

## 2023-04-26 ENCOUNTER — OFFICE VISIT (OUTPATIENT)
Dept: FAMILY MEDICINE CLINIC | Facility: CLINIC | Age: 68
End: 2023-04-26
Payer: MEDICARE

## 2023-04-26 VITALS
BODY MASS INDEX: 25.4 KG/M2 | OXYGEN SATURATION: 100 % | TEMPERATURE: 98 F | HEIGHT: 70 IN | WEIGHT: 177.4 LBS | SYSTOLIC BLOOD PRESSURE: 140 MMHG | DIASTOLIC BLOOD PRESSURE: 86 MMHG | RESPIRATION RATE: 18 BRPM | HEART RATE: 72 BPM

## 2023-04-26 DIAGNOSIS — E55.9 VITAMIN D DEFICIENCY: ICD-10-CM

## 2023-04-26 DIAGNOSIS — Z82.49 FAMILY HISTORY OF ISCHEMIC HEART DISEASE: ICD-10-CM

## 2023-04-26 DIAGNOSIS — E11.65 TYPE 2 DIABETES MELLITUS WITH HYPERGLYCEMIA, WITHOUT LONG-TERM CURRENT USE OF INSULIN: ICD-10-CM

## 2023-04-26 DIAGNOSIS — Z87.891 FORMER SMOKER: ICD-10-CM

## 2023-04-26 DIAGNOSIS — I10 ESSENTIAL HYPERTENSION: ICD-10-CM

## 2023-04-26 DIAGNOSIS — Z00.00 ANNUAL PHYSICAL EXAM: Primary | ICD-10-CM

## 2023-04-26 DIAGNOSIS — K21.9 GASTROESOPHAGEAL REFLUX DISEASE, UNSPECIFIED WHETHER ESOPHAGITIS PRESENT: ICD-10-CM

## 2023-04-26 DIAGNOSIS — Z76.89 ENCOUNTER TO ESTABLISH CARE: ICD-10-CM

## 2023-04-26 RX ORDER — BLOOD-GLUCOSE METER
1 KIT MISCELLANEOUS 3 TIMES DAILY
Qty: 1 EACH | Refills: 0 | Status: SHIPPED | OUTPATIENT
Start: 2023-04-26

## 2023-04-26 RX ORDER — CLOPIDOGREL BISULFATE 75 MG/1
1 TABLET ORAL DAILY
COMMUNITY
Start: 2023-01-29

## 2023-04-26 RX ORDER — UMECLIDINIUM BROMIDE AND VILANTEROL TRIFENATATE 62.5; 25 UG/1; UG/1
1 POWDER RESPIRATORY (INHALATION)
COMMUNITY

## 2023-04-26 RX ORDER — MONTELUKAST SODIUM 10 MG/1
10 TABLET ORAL NIGHTLY
COMMUNITY

## 2023-04-26 RX ORDER — LANCETS 30 GAUGE
1 EACH MISCELLANEOUS 2 TIMES DAILY
Qty: 100 EACH | Refills: 12 | Status: SHIPPED | OUTPATIENT
Start: 2023-04-26 | End: 2023-04-27

## 2023-04-26 RX ORDER — CALCIUM CARBONATE 300MG(750)
1 TABLET,CHEWABLE ORAL DAILY
COMMUNITY

## 2023-04-26 RX ORDER — NITROGLYCERIN 0.4 MG/1
0.4 TABLET SUBLINGUAL
COMMUNITY

## 2023-04-26 RX ORDER — RANOLAZINE 500 MG/1
500 TABLET, EXTENDED RELEASE ORAL 2 TIMES DAILY
COMMUNITY

## 2023-04-26 NOTE — PROGRESS NOTES
Chief Complaint  Establish Care (Cont care of htn, gerd)    Subjective        Louie Zamora presents to [facility] to establish care (annual physical; cont care of htn, gerd, diabetes, hyperlipidemia).    Hypertension  This is a chronic problem. Episode onset: 25 years or longer. The problem is unchanged. The problem is controlled. Associated symptoms include anxiety, blurred vision, chest pain, headaches, malaise/fatigue, palpitations, peripheral edema and shortness of breath. Pertinent negatives include no neck pain, orthopnea, PND or sweats. (Changed pacemaker approximately 3 months ago) Risk factors for coronary artery disease include male gender. Past treatments include angiotensin blockers, diuretics and beta blockers. Current antihypertension treatment includes angiotensin blockers, beta blockers and diuretics. The current treatment provides mild improvement. Compliance problems include diet, exercise and psychosocial issues.    Heartburn  He complains of belching, chest pain, coughing, heartburn and a sore throat. He reports no abdominal pain, no choking, no dysphagia, no early satiety, no globus sensation, no hoarse voice, no nausea, no stridor, no tooth decay, no water brash or no wheezing. This is a chronic problem. Episode onset: 30 years or longer. The problem occurs occasionally. The problem has been unchanged. The heartburn duration is less than a minute. The heartburn is located in the substernum. The heartburn is of mild intensity. The heartburn does not wake him from sleep. The heartburn does not limit his activity. The heartburn doesn't change with position. The symptoms are aggravated by certain foods, stress and smoking. Associated symptoms include fatigue. Pertinent negatives include no weight loss. Risk factors include lack of exercise. He has tried a PPI and a histamine-2 antagonist for the symptoms. Past procedures include an EGD. Pt sees Dr. Montoya (gastro) in  [place].   Diabetes  He presents for his initial diabetic visit. He has type 2 diabetes mellitus. No MedicAlert identification noted. His disease course has been stable. Hypoglycemia symptoms include headaches and nervousness/anxiousness. Pertinent negatives for hypoglycemia include no sweats. Associated symptoms include blurred vision, chest pain, fatigue, visual change and weakness. Pertinent negatives for diabetes include no foot paresthesias, no foot ulcerations, no polydipsia, no polyphagia, no polyuria and no weight loss. There are no hypoglycemic complications. Symptoms are stable. Risk factors for coronary artery disease include dyslipidemia, diabetes mellitus, hypertension, male sex, stress and sedentary lifestyle. Current diabetic treatment includes oral agent (monotherapy). He is compliant with treatment all of the time. His weight is stable. He is following a generally healthy diet. When asked about meal planning, he reported none. He has not had a previous visit with a dietitian. He rarely participates in exercise. There is no change in his home blood glucose trend. His overall blood glucose range is 130-140 mg/dl. An ACE inhibitor/angiotensin II receptor blocker is being taken. He does not see a podiatrist.Eye exam is not current.   Hyperlipidemia  This is a chronic problem. The current episode started more than 1 year ago. The problem is uncontrolled. Recent lipid tests were reviewed and are high. Exacerbating diseases include diabetes. Associated symptoms include chest pain and shortness of breath. Current antihyperlipidemic treatment includes statins. Compliance problems include adherence to diet, adherence to exercise and psychosocial issues.  Risk factors for coronary artery disease include diabetes mellitus, dyslipidemia, hypertension, male sex, stress and a sedentary lifestyle.       Objective   Vital Signs:  /86 (BP Location: Left arm, Patient Position: Sitting, Cuff Size: Adult)   Pulse  "72   Temp 98 °F (36.7 °C) (Temporal)   Resp 18   Ht 177.8 cm (70\")   Wt 80.5 kg (177 lb 6.4 oz)   SpO2 100%   BMI 25.45 kg/m²   Estimated body mass index is 25.45 kg/m² as calculated from the following:    Height as of this encounter: 177.8 cm (70\").    Weight as of this encounter: 80.5 kg (177 lb 6.4 oz).       BMI is >= 25 and <30. (Overweight) The following options were offered after discussion;: weight loss educational material (shared in after visit summary)      Physical Exam  Vitals and nursing note reviewed.   Constitutional:       General: He is awake.      Appearance: Normal appearance.   HENT:      Head: Normocephalic.      Right Ear: Tympanic membrane, ear canal and external ear normal. Decreased hearing noted.      Left Ear: Tympanic membrane, ear canal and external ear normal. Decreased hearing noted.      Nose: Nose normal.      Mouth/Throat:      Lips: Pink.      Mouth: Mucous membranes are moist.      Dentition: Abnormal dentition.      Pharynx: Oropharynx is clear.   Eyes:      General: Lids are normal.      Conjunctiva/sclera: Conjunctivae normal.      Pupils: Pupils are equal, round, and reactive to light.   Cardiovascular:      Rate and Rhythm: Normal rate and regular rhythm.      Pulses: Normal pulses.      Heart sounds: Normal heart sounds.      Comments: Pacemaker noted to left upper chest wall  Pulmonary:      Effort: Pulmonary effort is normal.      Breath sounds: Normal breath sounds.   Abdominal:      General: Abdomen is protuberant. Bowel sounds are normal.      Palpations: Abdomen is soft.      Tenderness: There is no abdominal tenderness.   Musculoskeletal:         General: Normal range of motion.      Cervical back: Normal range of motion and neck supple.      Right lower leg: No edema.      Left lower leg: No edema.   Skin:     General: Skin is warm and dry.      Capillary Refill: Capillary refill takes less than 2 seconds.   Neurological:      Mental Status: He is alert and " oriented to person, place, and time.      Cranial Nerves: Cranial nerves 2-12 are intact.      Sensory: Sensation is intact.      Motor: Motor function is intact.      Coordination: Coordination is intact.      Gait: Gait is intact.      Comments: Right  strength slightly less than left. 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   Psychiatric:         Attention and Perception: Attention and perception normal.         Mood and Affect: Affect normal. Mood is anxious.         Speech: Speech is rapid and pressured.         Behavior: Behavior normal. Behavior is cooperative.         Thought Content: Thought content normal.        Result Review :  The following data was reviewed by: SABIHA James on 04/26/2023:                    Assessment and Plan   Diagnoses and all orders for this visit:    1. Annual physical exam (Primary)  -     Tdap Vaccine Greater Than or Equal To 8yo IM  -     Pneumococcal Conjugate Vaccine 20-Valent (PCV20)  -     CBC w AUTO Differential; Future  -     Comprehensive metabolic panel; Future  -     Lipid panel; Future  -     TSH; Future  -     Hepatitis C antibody; Future    2. Encounter to establish care    3. Essential hypertension  Assessment & Plan:  Hypertension is unchanged.  Continue current treatment regimen.  Blood pressure will be reassessed at the next regular appointment.      4. Gastroesophageal reflux disease, unspecified whether esophagitis present  Comments:  continue prevacid as prescribed; will refill when indicated.    5. Type 2 diabetes mellitus with hyperglycemia, without long-term current use of insulin  -     glucose monitor monitoring kit; 1 each 3 (Three) Times a Day.  Dispense: 1 each; Refill: 0  -     Lancets misc; 1 Device 2 (Two) Times a Day.  Dispense: 100 each; Refill: 12  -     glucose blood test strip; Use as instructed  Dispense: 100 each; Refill: 12  -     Hemoglobin A1c; Future  -     MicroAlbumin, Urine, Random - Urine, Clean  Catch; Future    6. Vitamin D deficiency  -     Vitamin D 25 hydroxy; Future    7. Family history of ischemic heart disease  -     Abdominal Aortic Aneurysm Screening Medicare CAR; Future    8. Former smoker  -     CT Chest Low Dose Wo; Future       The preventive exam has been reviewed in detail.  The patient has been fully counseled on preventative guidelines for vaccines, cancer screenings, and other health maintenance needs.   The patient has been counseled on guidelines for maintaining a lifestyle to promote good health and to minimize chronic diseases.  The patient has been assisted with scheduling these healthcare procedures for the coming year and given a written document of health maintenance and anticipatory guidance for age with the AVS.    I spent 30 minutes caring for Louie on this date of service. This time includes time spent by me in the following activities:preparing for the visit, reviewing tests, obtaining and/or reviewing a separately obtained history, performing a medically appropriate examination and/or evaluation , counseling and educating the patient/family/caregiver, ordering medications, tests, or procedures, referring and communicating with other health care professionals , documenting information in the medical record, independently interpreting results and communicating that information with the patient/family/caregiver and care coordination     Follow Up   Return in about 3 months (around 7/26/2023) for Medicare Wellness.  Patient was given instructions and counseling regarding his condition or for health maintenance advice. Please see specific information pulled into the AVS if appropriate.       This document has been electronically signed by SABIHA James  April 26, 2023 15:54 EDT

## 2023-04-26 NOTE — PATIENT INSTRUCTIONS
"   Diabetes Mellitus and Exercise  Exercising regularly is important for overall health, especially for people who have diabetes mellitus. Exercising is not only about losing weight. It has many other health benefits, such as increasing muscle strength and bone density and reducing body fat and stress. This leads to improved fitness, flexibility, and endurance, all of which result in better overall health.  What are the benefits of exercise if I have diabetes?  Exercise has many benefits for people with diabetes. They include:  Helping to lower and control blood sugar (glucose).  Helping the body to respond better to the hormone insulin by improving insulin sensitivity.  Reducing how much insulin the body needs.  Lowering the risk for heart disease by:  Lowering \"bad\" cholesterol and triglyceride levels.  Increasing \"good\" cholesterol levels.  Lowering blood pressure.  Lowering blood glucose levels.  What is my activity plan?  Your health care provider or certified diabetes educator can help you make a plan for the type and frequency of exercise that works for you. This is called your activity plan. Be sure to:  Get at least 150 minutes of medium-intensity or high-intensity exercise each week. Exercises may include brisk walking, biking, or water aerobics.  Do stretching and strengthening exercises, such as yoga or weight lifting, at least 2 times a week.  Spread out your activity over at least 3 days of the week.  Get some form of physical activity each day.  Do not go more than 2 days in a row without some kind of physical activity.  Avoid being inactive for more than 90 minutes at a time. Take frequent breaks to walk or stretch.  Choose exercises or activities that you enjoy. Set realistic goals.  Start slowly and gradually increase your exercise intensity over time.  How do I manage my diabetes during exercise?  Monitor your blood glucose  Check your blood glucose before and after exercising. If your blood " glucose is:  240 mg/dL (13.3 mmol/L) or higher before you exercise, check your urine for ketones. These are chemicals created by the liver. If you have ketones in your urine, do not exercise until your blood glucose returns to normal.  100 mg/dL (5.6 mmol/L) or lower, eat a snack containing 15-20 grams of carbohydrate. Check your blood glucose 15 minutes after the snack to make sure that your glucose level is above 100 mg/dL (5.6 mmol/L) before you start your exercise.  Know the symptoms of low blood glucose (hypoglycemia) and how to treat it. Your risk for hypoglycemia increases during and after exercise.  Follow these tips and your health care provider's instructions  Keep a carbohydrate snack that is fast-acting for use before, during, and after exercise to help prevent or treat hypoglycemia.  Avoid injecting insulin into areas of the body that are going to be exercised. For example, avoid injecting insulin into:  Your arms, when you are about to play tennis.  Your legs, when you are about to go jogging.  Keep records of your exercise habits. Doing this can help you and your health care provider adjust your diabetes management plan as needed. Write down:  Food that you eat before and after you exercise.  Blood glucose levels before and after you exercise.  The type and amount of exercise you have done.  Work with your health care provider when you start a new exercise or activity. He or she may need to:  Make sure that the activity is safe for you.  Adjust your insulin, other medicines, and food that you eat.  Drink plenty of water while you exercise. This prevents loss of water (dehydration) and problems caused by a lot of heat in the body (heat stroke).  Where to find more information  American Diabetes Association: www.diabetes.org  Summary  Exercising regularly is important for overall health, especially for people who have diabetes mellitus.  Exercising has many health benefits. It increases muscle strength  and bone density and reduces body fat and stress. It also lowers and controls blood glucose.  Your health care provider or certified diabetes educator can help you make an activity plan for the type and frequency of exercise that works for you.  Work with your health care provider to make sure any new activity is safe for you. Also work with your health care provider to adjust your insulin, other medicines, and the food you eat.  This information is not intended to replace advice given to you by your health care provider. Make sure you discuss any questions you have with your health care provider.  Document Revised: 09/14/2020 Document Reviewed: 09/14/2020  [employer] Patient Education © 2022 [employer]         Diabetes Mellitus and Foot Care  Foot care is an important part of your health, especially when you have diabetes. Diabetes may cause you to have problems because of poor blood flow (circulation) to your feet and legs, which can cause your skin to:  Become thinner and drier.  Break more easily.  Heal more slowly.  Peel and crack.  You may also have nerve damage (neuropathy) in your legs and feet, causing decreased feeling in them. This means that you may not notice minor injuries to your feet that could lead to more serious problems. Noticing and addressing any potential problems early is the best way to prevent future foot problems.  How to care for your feet  Foot hygiene  Wash your feet daily with warm water and mild soap. Do not use hot water. Then, pat your feet and the areas between your toes until they are completely dry. Do not soak your feet as this can dry your skin.  Trim your toenails straight across. Do not dig under them or around the cuticle. File the edges of your nails with an emery board or nail file.  Apply a moisturizing lotion or petroleum jelly to the skin on your feet and to dry, brittle toenails. Use lotion that does not contain alcohol and is unscented. Do not apply lotion between  your toes.  Shoes and socks  Wear clean socks or stockings every day. Make sure they are not too tight. Do not wear knee-high stockings since they may decrease blood flow to your legs.  Wear shoes that fit properly and have enough cushioning. Always look in your shoes before you put them on to be sure there are no objects inside.  To break in new shoes, wear them for just a few hours a day. This prevents injuries on your feet.  Wounds, scrapes, corns, and calluses  Check your feet daily for blisters, cuts, bruises, sores, and redness. If you cannot see the bottom of your feet, use a mirror or ask someone for help.  Do not cut corns or calluses or try to remove them with medicine.  If you find a minor scrape, cut, or break in the skin on your feet, keep it and the skin around it clean and dry. You may clean these areas with mild soap and water. Do not clean the area with peroxide, alcohol, or iodine.  If you have a wound, scrape, corn, or callus on your foot, look at it several times a day to make sure it is healing and not infected. Check for:  Redness, swelling, or pain.  Fluid or blood.  Warmth.  Pus or a bad smell.  General tips  Do not cross your legs. This may decrease blood flow to your feet.  Do not use heating pads or hot water bottles on your feet. They may burn your skin. If you have lost feeling in your feet or legs, you may not know this is happening until it is too late.  Protect your feet from hot and cold by wearing shoes, such as at the beach or on hot pavement.  Schedule a complete foot exam at least once a year (annually) or more often if you have foot problems. Report any cuts, sores, or bruises to your health care provider immediately.  Where to find more information  American Diabetes Association: www.diabetes.org  Association of Diabetes Care & Education Specialists: www.diabeteseducator.org  Contact a health care provider if:  You have a medical condition that increases your risk of infection  and you have any cuts, sores, or bruises on your feet.  You have an injury that is not healing.  You have redness on your legs or feet.  You feel burning or tingling in your legs or feet.  You have pain or cramps in your legs and feet.  Your legs or feet are numb.  Your feet always feel cold.  You have pain around any toenails.  Get help right away if:  You have a wound, scrape, corn, or callus on your foot and:  You have pain, swelling, or redness that gets worse.  You have fluid or blood coming from the wound, scrape, corn, or callus.  Your wound, scrape, corn, or callus feels warm to the touch.  You have pus or a bad smell coming from the wound, scrape, corn, or callus.  You have a fever.  You have a red line going up your leg.  Summary  Check your feet every day for blisters, cuts, bruises, sores, and redness.  Apply a moisturizing lotion or petroleum jelly to the skin on your feet and to dry, brittle toenails.  Wear shoes that fit properly and have enough cushioning.  If you have foot problems, report any cuts, sores, or bruises to your health care provider immediately.  Schedule a complete foot exam at least once a year (annually) or more often if you have foot problems.  This information is not intended to replace advice given to you by your health care provider. Make sure you discuss any questions you have with your health care provider.  Document Revised: 07/08/2021 Document Reviewed: 07/08/2021  [employer] Patient Education © 2022 [employer]         Diabetes Mellitus and Nutrition, Adult  When you have diabetes, or diabetes mellitus, it is very important to have healthy eating habits because your blood sugar (glucose) levels are greatly affected by what you eat and drink. Eating healthy foods in the right amounts, at about the same times every day, can help you:  Manage your blood glucose.  Lower your risk of heart disease.  Improve your blood pressure.  Reach or maintain a healthy weight.  What can  affect my meal plan?  Every person with diabetes is different, and each person has different needs for a meal plan. Your health care provider may recommend that you work with a dietitian to make a meal plan that is best for you. Your meal plan may vary depending on factors such as:  The calories you need.  The medicines you take.  Your weight.  Your blood glucose, blood pressure, and cholesterol levels.  Your activity level.  Other health conditions you have, such as heart or kidney disease.  How do carbohydrates affect me?  Carbohydrates, also called carbs, affect your blood glucose level more than any other type of food. Eating carbs raises the amount of glucose in your blood.  It is important to know how many carbs you can safely have in each meal. This is different for every person. Your dietitian can help you calculate how many carbs you should have at each meal and for each snack.  How does alcohol affect me?  Alcohol can cause a decrease in blood glucose (hypoglycemia), especially if you use insulin or take certain diabetes medicines by mouth. Hypoglycemia can be a life-threatening condition. Symptoms of hypoglycemia, such as sleepiness, dizziness, and confusion, are similar to symptoms of having too much alcohol.  Do not drink alcohol if:  Your health care provider tells you not to drink.  You are pregnant, may be pregnant, or are planning to become pregnant.  If you drink alcohol:  Limit how much you have to:  0-1 drink a day for women.  0-2 drinks a day for men.  Know how much alcohol is in your drink. In the [place] one drink equals one 12 oz bottle of beer (355 mL), one 5 oz glass of wine (148 mL), or one 1½ oz glass of hard liquor (44 mL).  Keep yourself hydrated with water, diet soda, or unsweetened iced tea. Keep in mind that regular soda, juice, and other mixers may contain a lot of sugar and must be counted as carbs.  What are tips for following this plan?  Reading food labels  Start by checking the  "serving size on the Nutrition Facts label of packaged foods and drinks. The number of calories and the amount of carbs, fats, and other nutrients listed on the label are based on one serving of the item. Many items contain more than one serving per package.  Check the total grams (g) of carbs in one serving.  Check the number of grams of saturated fats and trans fats in one serving. Choose foods that have a low amount or none of these fats.  Check the number of milligrams (mg) of salt (sodium) in one serving. Most people should limit total sodium intake to less than 2,300 mg per day.  Always check the nutrition information of foods labeled as \"low-fat\" or \"nonfat.\" These foods may be higher in added sugar or refined carbs and should be avoided.  Talk to your dietitian to identify your daily goals for nutrients listed on the label.  Shopping  Avoid buying canned, pre-made, or processed foods. These foods tend to be high in fat, sodium, and added sugar.  Shop around the outside edge of the grocery store. This is where you will most often find fresh fruits and vegetables, bulk grains, fresh meats, and fresh dairy products.  Cooking  Use low-heat cooking methods, such as baking, instead of high-heat cooking methods, such as deep frying.  Cook using healthy oils, such as olive, canola, or sunflower oil.  Avoid cooking with butter, cream, or high-fat meats.  Meal planning  Eat meals and snacks regularly, preferably at the same times every day. Avoid going long periods of time without eating.  Eat foods that are high in fiber, such as fresh fruits, vegetables, beans, and whole grains.  Eat 4-6 oz (112-168 g) of lean protein each day, such as lean meat, chicken, fish, eggs, or tofu. One ounce (oz) (28 g) of lean protein is equal to:  1 oz (28 g) of meat, chicken, or fish.  1 egg.  ¼ cup (62 g) of tofu.  Eat some foods each day that contain healthy fats, such as avocado, nuts, seeds, and fish.  What foods should I " eat?  Fruits  Berries. Apples. Oranges. Peaches. Apricots. Plums. Grapes. Mangoes. Papayas. Pomegranates. Kiwi. Cherries.  Vegetables  Leafy greens, including lettuce, spinach, kale, chard, huseyin greens, mustard greens, and cabbage. Beets. Cauliflower. Broccoli. Carrots. Green beans. Tomatoes. Peppers. Onions. Cucumbers. [place] sprouts.  Grains  Whole grains, such as whole-wheat or whole-grain bread, crackers, tortillas, cereal, and pasta. Unsweetened oatmeal. Quinoa. Brown or wild rice.  Meats and other proteins  Seafood. Poultry without skin. Lean cuts of poultry and beef. Tofu. Nuts. Seeds.  Dairy  Low-fat or fat-free dairy products such as milk, yogurt, and cheese.  The items listed above may not be a complete list of foods and beverages you can eat and drink. Contact a dietitian for more information.  What foods should I avoid?  Fruits  Fruits canned with syrup.  Vegetables  Canned vegetables. Frozen vegetables with butter or cream sauce.  Grains  Refined white flour and flour products such as bread, pasta, snack foods, and cereals. Avoid all processed foods.  Meats and other proteins  Fatty cuts of meat. Poultry with skin. Breaded or fried meats. Processed meat. Avoid saturated fats.  Dairy  Full-fat yogurt, cheese, or milk.  Beverages  Sweetened drinks, such as soda or iced tea.  The items listed above may not be a complete list of foods and beverages you should avoid. Contact a dietitian for more information.  Questions to ask a health care provider  Do I need to meet with a certified diabetes care and ?  Do I need to meet with a dietitian?  What number can I call if I have questions?  When are the best times to check my blood glucose?  Where to find more information:  American Diabetes Association: diabetes.org  Academy of Nutrition and Dietetics: eatright.org  National [place] of Diabetes and Digestive and Kidney Diseases: niddk.nih.gov  Association of Diabetes Care & Education  "Specialists: diabeteseducator.org  Summary  It is important to have healthy eating habits because your blood sugar (glucose) levels are greatly affected by what you eat and drink. It is important to use alcohol carefully.  A healthy meal plan will help you manage your blood glucose and lower your risk of heart disease.  Your health care provider may recommend that you work with a dietitian to make a meal plan that is best for you.  This information is not intended to replace advice given to you by your health care provider. Make sure you discuss any questions you have with your health care provider.  Document Revised: 07/21/2021 Document Reviewed: 07/21/2021  [employer] Patient Education © 2022 [employer]       Hypertension, Adult  High blood pressure (hypertension) is when the force of blood pumping through the arteries is too strong. The arteries are the blood vessels that carry blood from the heart throughout the body. Hypertension forces the heart to work harder to pump blood and may cause arteries to become narrow or stiff. Untreated or uncontrolled hypertension can cause a heart attack, heart failure, a stroke, kidney disease, and other problems.  A blood pressure reading consists of a higher number over a lower number. Ideally, your blood pressure should be below 120/80. The first (\"top\") number is called the systolic pressure. It is a measure of the pressure in your arteries as your heart beats. The second (\"bottom\") number is called the diastolic pressure. It is a measure of the pressure in your arteries as the heart relaxes.  What are the causes?  The exact cause of this condition is not known. There are some conditions that result in or are related to high blood pressure.  What increases the risk?  Some risk factors for high blood pressure are under your control. The following factors may make you more likely to develop this condition:  Smoking.  Having type 2 diabetes mellitus, high cholesterol, or " both.  Not getting enough exercise or physical activity.  Being overweight.  Having too much fat, sugar, calories, or salt (sodium) in your diet.  Drinking too much alcohol.  Some risk factors for high blood pressure may be difficult or impossible to change. Some of these factors include:  Having chronic kidney disease.  Having a family history of high blood pressure.  Age. Risk increases with age.  Race. You may be at higher risk if you are .  Gender. Men are at higher risk than women before age 45. After age 65, women are at higher risk than men.  Having obstructive sleep apnea.  Stress.  What are the signs or symptoms?  High blood pressure may not cause symptoms. Very high blood pressure (hypertensive crisis) may cause:  Headache.  Anxiety.  Shortness of breath.  Nosebleed.  Nausea and vomiting.  Vision changes.  Severe chest pain.  Seizures.  How is this diagnosed?  This condition is diagnosed by measuring your blood pressure while you are seated, with your arm resting on a flat surface, your legs uncrossed, and your feet flat on the floor. The cuff of the blood pressure monitor will be placed directly against the skin of your upper arm at the level of your heart. It should be measured at least twice using the same arm. Certain conditions can cause a difference in blood pressure between your right and left arms.  Certain factors can cause blood pressure readings to be lower or higher than normal for a short period of time:  When your blood pressure is higher when you are in a health care provider's office than when you are at home, this is called white coat hypertension. Most people with this condition do not need medicines.  When your blood pressure is higher at home than when you are in a health care provider's office, this is called masked hypertension. Most people with this condition may need medicines to control blood pressure.  If you have a high blood pressure reading during one visit or  you have normal blood pressure with other risk factors, you may be asked to:  Return on a different day to have your blood pressure checked again.  Monitor your blood pressure at home for 1 week or longer.  If you are diagnosed with hypertension, you may have other blood or imaging tests to help your health care provider understand your overall risk for other conditions.  How is this treated?  This condition is treated by making healthy lifestyle changes, such as eating healthy foods, exercising more, and reducing your alcohol intake. Your health care provider may prescribe medicine if lifestyle changes are not enough to get your blood pressure under control, and if:  Your systolic blood pressure is above 130.  Your diastolic blood pressure is above 80.  Your personal target blood pressure may vary depending on your medical conditions, your age, and other factors.  Follow these instructions at home:  Eating and drinking    Eat a diet that is high in fiber and potassium, and low in sodium, added sugar, and fat. An example eating plan is called the DASH (Dietary Approaches to Stop Hypertension) diet. To eat this way:  Eat plenty of fresh fruits and vegetables. Try to fill one half of your plate at each meal with fruits and vegetables.  Eat whole grains, such as whole-wheat pasta, brown rice, or whole-grain bread. Fill about one fourth of your plate with whole grains.  Eat or drink low-fat dairy products, such as skim milk or low-fat yogurt.  Avoid fatty cuts of meat, processed or cured meats, and poultry with skin. Fill about one fourth of your plate with lean proteins, such as fish, chicken without skin, beans, eggs, or tofu.  Avoid pre-made and processed foods. These tend to be higher in sodium, added sugar, and fat.  Reduce your daily sodium intake. Most people with hypertension should eat less than 1,500 mg of sodium a day.  Do not drink alcohol if:  Your health care provider tells you not to drink.  You are  pregnant, may be pregnant, or are planning to become pregnant.  If you drink alcohol:  Limit how much you use to:  0-1 drink a day for women.  0-2 drinks a day for men.  Be aware of how much alcohol is in your drink. In the [place] one drink equals one 12 oz bottle of beer (355 mL), one 5 oz glass of wine (148 mL), or one 1½ oz glass of hard liquor (44 mL).  Lifestyle    Work with your health care provider to maintain a healthy body weight or to lose weight. Ask what an ideal weight is for you.  Get at least 30 minutes of exercise most days of the week. Activities may include walking, swimming, or biking.  Include exercise to strengthen your muscles (resistance exercise), such as Pilates or lifting weights, as part of your weekly exercise routine. Try to do these types of exercises for 30 minutes at least 3 days a week.  Do not use any products that contain nicotine or tobacco, such as cigarettes, e-cigarettes, and chewing tobacco. If you need help quitting, ask your health care provider.  Monitor your blood pressure at home as told by your health care provider.  Keep all follow-up visits as told by your health care provider. This is important.  Medicines  Take over-the-counter and prescription medicines only as told by your health care provider. Follow directions carefully. Blood pressure medicines must be taken as prescribed.  Do not skip doses of blood pressure medicine. Doing this puts you at risk for problems and can make the medicine less effective.  Ask your health care provider about side effects or reactions to medicines that you should watch for.  Contact a health care provider if you:  Think you are having a reaction to a medicine you are taking.  Have headaches that keep coming back (recurring).  Feel dizzy.  Have swelling in your ankles.  Have trouble with your vision.  Get help right away if you:  Develop a severe headache or confusion.  Have unusual weakness or numbness.  Feel faint.  Have severe pain  in your chest or abdomen.  Vomit repeatedly.  Have trouble breathing.  Summary  Hypertension is when the force of blood pumping through your arteries is too strong. If this condition is not controlled, it may put you at risk for serious complications.  Your personal target blood pressure may vary depending on your medical conditions, your age, and other factors. For most people, a normal blood pressure is less than 120/80.  Hypertension is treated with lifestyle changes, medicines, or a combination of both. Lifestyle changes include losing weight, eating a healthy, low-sodium diet, exercising more, and limiting alcohol.  This information is not intended to replace advice given to you by your health care provider. Make sure you discuss any questions you have with your health care provider.  Document Revised: 08/28/2019 Document Reviewed: 08/28/2019  [employer] Patient Education © 2022 [employer] High Cholesterol  High cholesterol is a condition in which the blood has high levels of a white, waxy substance similar to fat (cholesterol). The liver makes all the cholesterol that the body needs. The human body needs small amounts of cholesterol to help build cells. A person gets extra or excess cholesterol from the food that he or she eats.  The blood carries cholesterol from the liver to the rest of the body. If you have high cholesterol, deposits (plaques) may build up on the walls of your arteries. Arteries are the blood vessels that carry blood away from your heart. These plaques make the arteries narrow and stiff.  Cholesterol plaques increase your risk for heart attack and stroke. Work with your health care provider to keep your cholesterol levels in a healthy range.  What increases the risk?  The following factors may make you more likely to develop this condition:  Eating foods that are high in animal fat (saturated fat) or cholesterol.  Being overweight.  Not getting enough exercise.  A family history of high  "cholesterol (familial hypercholesterolemia).  Use of tobacco products.  Having diabetes.  What are the signs or symptoms?  In most cases, high cholesterol does not usually cause any symptoms.  In severe cases, very high cholesterol levels can cause:  Fatty bumps under the skin (xanthomas).  A white or gray ring around the black center (pupil) of the eye.  How is this diagnosed?  This condition may be diagnosed based on the results of a blood test.  If you are older than 20 years of age, your health care provider may check your cholesterol levels every 4-6 years.  You may be checked more often if you have high cholesterol or other risk factors for heart disease.  The blood test for cholesterol measures:  \"Bad\" cholesterol, or LDL cholesterol. This is the main type of cholesterol that causes heart disease. The desired level is less than 100 mg/dL (2.59 mmol/L).  \"Good\" cholesterol, or HDL cholesterol. HDL helps protect against heart disease by cleaning the arteries and carrying the LDL to the liver for processing. The desired level for HDL is 60 mg/dL (1.55 mmol/L) or higher.  Triglycerides. These are fats that your body can store or burn for energy. The desired level is less than 150 mg/dL (1.69 mmol/L).  Total cholesterol. This measures the total amount of cholesterol in your blood and includes LDL, HDL, and triglycerides. The desired level is less than 200 mg/dL (5.17 mmol/L).  How is this treated?  Treatment for high cholesterol starts with lifestyle changes, such as diet and exercise.  Diet changes. You may be asked to eat foods that have more fiber and less saturated fats or added sugar.  Lifestyle changes. These may include regular exercise, maintaining a healthy weight, and quitting use of tobacco products.  Medicines. These are given when diet and lifestyle changes have not worked. You may be prescribed a statin medicine to help lower your cholesterol levels.  Follow these instructions at home:  Eating and " drinking    Eat a healthy, balanced diet. This diet includes:  Daily servings of a variety of fresh, frozen, or canned fruits and vegetables.  Daily servings of whole grain foods that are rich in fiber.  Foods that are low in saturated fats and trans fats. These include poultry and fish without skin, lean cuts of meat, and low-fat dairy products.  A variety of fish, especially oily fish that contain omega-3 fatty acids. Aim to eat fish at least 2 times a week.  Avoid foods and drinks that have added sugar.  Use healthy cooking methods, such as roasting, grilling, broiling, baking, poaching, steaming, and stir-frying. Do not monteiro your food except for stir-frying.  If you drink alcohol:  Limit how much you have to:  0-1 drink a day for women who are not pregnant.  0-2 drinks a day for men.  Know how much alcohol is in a drink. In the [place] one drink equals one 12 oz bottle of beer (355 mL), one 5 oz glass of wine (148 mL), or one 1½ oz glass of hard liquor (44 mL).  Lifestyle    Get regular exercise. Aim to exercise for a total of 150 minutes a week. Increase your activity level by doing activities such as gardening, walking, and taking the stairs.  Do not use any products that contain nicotine or tobacco. These products include cigarettes, chewing tobacco, and vaping devices, such as e-cigarettes. If you need help quitting, ask your health care provider.  General instructions  Take over-the-counter and prescription medicines only as told by your health care provider.  Keep all follow-up visits. This is important.  Where to find more information  American Heart Association: www.heart.org  National Heart, Lung, and Blood [place]: www.nhlbi.nih.gov  Contact a health care provider if:  You have trouble achieving or maintaining a healthy diet or weight.  You are starting an exercise program.  You are unable to stop smoking.  Get help right away if:  You have chest pain.  You have trouble breathing.  You have discomfort  "or pain in your jaw, neck, back, shoulder, or arm.  You have any symptoms of a stroke. \"BE FAST\" is an easy way to remember the main warning signs of a stroke:  B - Balance. Signs are dizziness, sudden trouble walking, or loss of balance.  E - Eyes. Signs are trouble seeing or a sudden change in vision.  F - Face. Signs are sudden weakness or numbness of the face, or the face or eyelid drooping on one side.  A - Arms. Signs are weakness or numbness in an arm. This happens suddenly and usually on one side of the body.  S - Speech. Signs are sudden trouble speaking, slurred speech, or trouble understanding what people say.  T - Time. Time to call emergency services. Write down what time symptoms started.  You have other signs of a stroke, such as:  A sudden, severe headache with no known cause.  Nausea or vomiting.  Seizure.  These symptoms may represent a serious problem that is an emergency. Do not wait to see if the symptoms will go away. Get medical help right away. Call your local emergency services (911 in the [place] Do not drive yourself to the hospital.  Summary  Cholesterol plaques increase your risk for heart attack and stroke. Work with your health care provider to keep your cholesterol levels in a healthy range.  Eat a healthy, balanced diet, get regular exercise, and maintain a healthy weight.  Do not use any products that contain nicotine or tobacco. These products include cigarettes, chewing tobacco, and vaping devices, such as e-cigarettes.  Get help right away if you have any symptoms of a stroke.  This information is not intended to replace advice given to you by your health care provider. Make sure you discuss any questions you have with your health care provider.  Document Revised: 03/03/2022 Document Reviewed: 02/21/2022  Elsevier Patient Education © 2022 [employer]. Gastroesophageal Reflux Disease, Adult  Gastroesophageal reflux (LISETTE) happens when acid from the stomach flows up into the tube " that connects the mouth and the stomach (esophagus). Normally, food travels down the esophagus and stays in the stomach to be digested. With LISETTE, food and stomach acid sometimes move back up into the esophagus.  You may have a disease called gastroesophageal reflux disease (GERD) if the reflux:  Happens often.  Causes frequent or very bad symptoms.  Causes problems such as damage to the esophagus.  When this happens, the esophagus becomes sore and swollen. Over time, GERD can make small holes (ulcers) in the lining of the esophagus.  What are the causes?  This condition is caused by a problem with the muscle between the esophagus and the stomach. When this muscle is weak or not normal, it does not close properly to keep food and acid from coming back up from the stomach.  The muscle can be weak because of:  Tobacco use.  Pregnancy.  Having a certain type of hernia (hiatal hernia).  Alcohol use.  Certain foods and drinks, such as coffee, chocolate, onions, and peppermint.  What increases the risk?  Being overweight.  Having a disease that affects your connective tissue.  Taking NSAIDs, such a ibuprofen.  What are the signs or symptoms?  Heartburn.  Difficult or painful swallowing.  The feeling of having a lump in the throat.  A bitter taste in the mouth.  Bad breath.  Having a lot of saliva.  Having an upset or bloated stomach.  Burping.  Chest pain. Different conditions can cause chest pain. Make sure you see your doctor if you have chest pain.  Shortness of breath or wheezing.  A long-term cough or a cough at night.  Wearing away of the surface of teeth (tooth enamel).  Weight loss.  How is this treated?  Making changes to your diet.  Taking medicine.  Having surgery.  Treatment will depend on how bad your symptoms are.  Follow these instructions at home:  Eating and drinking    Follow a diet as told by your doctor. You may need to avoid foods and drinks such as:  Coffee and tea, with or without caffeine.  Drinks  that contain alcohol.  Energy drinks and sports drinks.  Bubbly (carbonated) drinks or sodas.  Chocolate and cocoa.  Peppermint and mint flavorings.  Garlic and onions.  Horseradish.  Spicy and acidic foods. These include peppers, chili powder, qiu powder, vinegar, hot sauces, and BBQ sauce.  Citrus fruit juices and citrus fruits, such as oranges, abdiel, and limes.  Tomato-based foods. These include red sauce, chili, salsa, and pizza with red sauce.  Fried and fatty foods. These include donuts, french fries, potato chips, and high-fat dressings.  High-fat meats. These include hot dogs, rib eye steak, sausage, ham, and young.  High-fat dairy items, such as whole milk, butter, and cream cheese.  Eat small meals often. Avoid eating large meals.  Avoid drinking large amounts of liquid with your meals.  Avoid eating meals during the 2-3 hours before bedtime.  Avoid lying down right after you eat.  Do not exercise right after you eat.  Lifestyle    Do not smoke or use any products that contain nicotine or tobacco. If you need help quitting, ask your doctor.  Try to lower your stress. If you need help doing this, ask your doctor.  If you are overweight, lose an amount of weight that is healthy for you. Ask your doctor about a safe weight loss goal.  General instructions  Pay attention to any changes in your symptoms.  Take over-the-counter and prescription medicines only as told by your doctor.  Do not take aspirin, ibuprofen, or other NSAIDs unless your doctor says it is okay.  Wear loose clothes. Do not wear anything tight around your waist.  Raise (elevate) the head of your bed about 6 inches (15 cm). You may need to use a wedge to do this.  Avoid bending over if this makes your symptoms worse.  Keep all follow-up visits.  Contact a doctor if:  You have new symptoms.  You lose weight and you do not know why.  You have trouble swallowing or it hurts to swallow.  You have wheezing or a cough that keeps happening.  You  have a hoarse voice.  Your symptoms do not get better with treatment.  Get help right away if:  You have sudden pain in your arms, neck, jaw, teeth, or back.  You suddenly feel sweaty, dizzy, or light-headed.  You have chest pain or shortness of breath.  You vomit and the vomit is green, yellow, or black, or it looks like blood or coffee grounds.  You faint.  Your poop (stool) is red, bloody, or black.  You cannot swallow, drink, or eat.  These symptoms may represent a serious problem that is an emergency. Do not wait to see if the symptoms will go away. Get medical help right away. Call your local emergency services (911 in the [place] Do not drive yourself to the hospital.  Summary  If a person has gastroesophageal reflux disease (GERD), food and stomach acid move back up into the esophagus and cause symptoms or problems such as damage to the esophagus.  Treatment will depend on how bad your symptoms are.  Follow a diet as told by your doctor.  Take all medicines only as told by your doctor.  This information is not intended to replace advice given to you by your health care provider. Make sure you discuss any questions you have with your health care provider.  Document Revised: 06/28/2021 Document Reviewed: 06/28/2021  Else"[employer]" Patient Education © 2022 Else[employer] Inc.

## 2023-04-27 ENCOUNTER — TELEPHONE (OUTPATIENT)
Dept: FAMILY MEDICINE CLINIC | Facility: CLINIC | Age: 68
End: 2023-04-27
Payer: MEDICARE

## 2023-04-27 RX ORDER — LANCETS 30 GAUGE
1 EACH MISCELLANEOUS 3 TIMES DAILY
Qty: 100 EACH | Refills: 12 | Status: SHIPPED | OUTPATIENT
Start: 2023-04-27

## 2023-04-27 NOTE — TELEPHONE ENCOUNTER
Pharmacy Name:  [facility]     Pharmacy representative name: N/A    Pharmacy representative phone number: [phone]    What medication are you calling in regards to: glucose monitor monitoring kit    What question does the pharmacy have: PHARMACY WOULD LIKE TO CLARIFY INSTRUCTIONS    Who is the provider that prescribed the medication: BROWN    Additional notes: N/A

## 2023-04-27 NOTE — TELEPHONE ENCOUNTER
Changed directions for lancets, test strips, and monitor to match.  Resent prescriptions to [facility] pharmacy.      This document has been electronically signed by SABIHA James  April 27, 2023 11:19 EDT

## 2023-04-27 NOTE — TELEPHONE ENCOUNTER
Incoming Refill Request      Medication requested (name and dose): onetouch verio test strip    Pharmacy where request should be sent: [email or web address]    Additional details provided by patient: n/a    Best call back number: [phone]     Does the patient have less than a 3 day supply:  [x] Yes  [] No    Judith Joseph Rep  04/27/23, 10:25 EDT

## 2023-05-02 ENCOUNTER — LAB (OUTPATIENT)
Dept: FAMILY MEDICINE CLINIC | Facility: CLINIC | Age: 68
End: 2023-05-02
Payer: MEDICARE

## 2023-05-02 DIAGNOSIS — E55.9 VITAMIN D DEFICIENCY: ICD-10-CM

## 2023-05-02 DIAGNOSIS — Z00.00 ANNUAL PHYSICAL EXAM: ICD-10-CM

## 2023-05-02 DIAGNOSIS — E11.65 TYPE 2 DIABETES MELLITUS WITH HYPERGLYCEMIA, WITHOUT LONG-TERM CURRENT USE OF INSULIN: ICD-10-CM

## 2023-05-02 LAB
ALBUMIN SERPL-MCNC: 4.7 G/DL (ref 3.5–5.2)
ALBUMIN/GLOB SERPL: 1.5 G/DL
ALP SERPL-CCNC: 57 U/L (ref 39–117)
ALT SERPL W P-5'-P-CCNC: 8 U/L (ref 1–41)
ANION GAP SERPL CALCULATED.3IONS-SCNC: 9.8 MMOL/L (ref 5–15)
AST SERPL-CCNC: 12 U/L (ref 1–40)
BASOPHILS # BLD AUTO: 0.06 10*3/MM3 (ref 0–0.2)
BASOPHILS NFR BLD AUTO: 1.2 % (ref 0–1.5)
BILIRUB SERPL-MCNC: 0.5 MG/DL (ref 0–1.2)
BUN SERPL-MCNC: 15 MG/DL (ref 8–23)
BUN/CREAT SERPL: 13.9 (ref 7–25)
CALCIUM SPEC-SCNC: 10.2 MG/DL (ref 8.6–10.5)
CHLORIDE SERPL-SCNC: 104 MMOL/L (ref 98–107)
CHOLEST SERPL-MCNC: 155 MG/DL (ref 0–200)
CO2 SERPL-SCNC: 30.2 MMOL/L (ref 22–29)
CREAT SERPL-MCNC: 1.08 MG/DL (ref 0.76–1.27)
DEPRECATED RDW RBC AUTO: 47.8 FL (ref 37–54)
EGFRCR SERPLBLD CKD-EPI 2021: 74.7 ML/MIN/1.73
EOSINOPHIL # BLD AUTO: 0.33 10*3/MM3 (ref 0–0.4)
EOSINOPHIL NFR BLD AUTO: 6.5 % (ref 0.3–6.2)
ERYTHROCYTE [DISTWIDTH] IN BLOOD BY AUTOMATED COUNT: 13.1 % (ref 12.3–15.4)
GLOBULIN UR ELPH-MCNC: 3.1 GM/DL
GLUCOSE SERPL-MCNC: 148 MG/DL (ref 65–99)
HBA1C MFR BLD: 6 % (ref 4.8–5.6)
HCT VFR BLD AUTO: 43.4 % (ref 37.5–51)
HCV AB SER DONR QL: NORMAL
HDLC SERPL-MCNC: 48 MG/DL (ref 40–60)
HGB BLD-MCNC: 13.9 G/DL (ref 13–17.7)
IMM GRANULOCYTES # BLD AUTO: 0.01 10*3/MM3 (ref 0–0.05)
IMM GRANULOCYTES NFR BLD AUTO: 0.2 % (ref 0–0.5)
LDLC SERPL CALC-MCNC: 81 MG/DL (ref 0–100)
LDLC/HDLC SERPL: 1.59 {RATIO}
LYMPHOCYTES # BLD AUTO: 1.72 10*3/MM3 (ref 0.7–3.1)
LYMPHOCYTES NFR BLD AUTO: 33.7 % (ref 19.6–45.3)
MCH RBC QN AUTO: 31.5 PG (ref 26.6–33)
MCHC RBC AUTO-ENTMCNC: 32 G/DL (ref 31.5–35.7)
MCV RBC AUTO: 98.4 FL (ref 79–97)
MONOCYTES # BLD AUTO: 0.52 10*3/MM3 (ref 0.1–0.9)
MONOCYTES NFR BLD AUTO: 10.2 % (ref 5–12)
NEUTROPHILS NFR BLD AUTO: 2.47 10*3/MM3 (ref 1.7–7)
NEUTROPHILS NFR BLD AUTO: 48.2 % (ref 42.7–76)
NRBC BLD AUTO-RTO: 0 /100 WBC (ref 0–0.2)
PLATELET # BLD AUTO: 180 10*3/MM3 (ref 140–450)
PMV BLD AUTO: 11.7 FL (ref 6–12)
POTASSIUM SERPL-SCNC: 4.4 MMOL/L (ref 3.5–5.2)
PROT SERPL-MCNC: 7.8 G/DL (ref 6–8.5)
RBC # BLD AUTO: 4.41 10*6/MM3 (ref 4.14–5.8)
SODIUM SERPL-SCNC: 144 MMOL/L (ref 136–145)
TRIGL SERPL-MCNC: 153 MG/DL (ref 0–150)
TSH SERPL DL<=0.05 MIU/L-ACNC: 1.12 UIU/ML (ref 0.27–4.2)
VLDLC SERPL-MCNC: 26 MG/DL (ref 5–40)
WBC NRBC COR # BLD: 5.11 10*3/MM3 (ref 3.4–10.8)

## 2023-05-02 PROCEDURE — 86803 HEPATITIS C AB TEST: CPT | Performed by: NURSE PRACTITIONER

## 2023-05-02 PROCEDURE — 85025 COMPLETE CBC W/AUTO DIFF WBC: CPT | Performed by: NURSE PRACTITIONER

## 2023-05-02 PROCEDURE — 83036 HEMOGLOBIN GLYCOSYLATED A1C: CPT | Performed by: NURSE PRACTITIONER

## 2023-05-02 PROCEDURE — 82043 UR ALBUMIN QUANTITATIVE: CPT | Performed by: NURSE PRACTITIONER

## 2023-05-02 PROCEDURE — 84443 ASSAY THYROID STIM HORMONE: CPT | Performed by: NURSE PRACTITIONER

## 2023-05-02 PROCEDURE — 80061 LIPID PANEL: CPT | Performed by: NURSE PRACTITIONER

## 2023-05-02 PROCEDURE — 82306 VITAMIN D 25 HYDROXY: CPT | Performed by: NURSE PRACTITIONER

## 2023-05-02 PROCEDURE — 80053 COMPREHEN METABOLIC PANEL: CPT | Performed by: NURSE PRACTITIONER

## 2023-05-03 LAB
25(OH)D3 SERPL-MCNC: 30.5 NG/ML (ref 30–100)
ALBUMIN UR-MCNC: 2.3 MG/DL

## 2023-05-05 RX ORDER — CLOPIDOGREL BISULFATE 75 MG/1
75 TABLET ORAL DAILY
Qty: 30 TABLET | Refills: 2 | Status: SHIPPED | OUTPATIENT
Start: 2023-05-05

## 2023-05-05 NOTE — TELEPHONE ENCOUNTER
Rx Refill Note  Requested Prescriptions     Pending Prescriptions Disp Refills   • clopidogrel (PLAVIX) 75 MG tablet 30 tablet      Sig: Take 1 tablet by mouth Daily.      Last office visit with prescribing clinician: 4/26/2023   Last telemedicine visit with prescribing clinician: Visit date not found   Next office visit with prescribing clinician: Visit date not found                         Would you like a call back once the refill request has been completed: [] Yes [] No    If the office needs to give you a call back, can they leave a voicemail: [] Yes [] No    Marina Nguyen RN  05/05/23, 12:48 EDT

## 2023-05-05 NOTE — TELEPHONE ENCOUNTER
Caller: LAUREANO SANZ    Relationship: Emergency Contact    Best call back number: [phone]    Requested Prescriptions:   Requested Prescriptions     Pending Prescriptions Disp Refills   • clopidogrel (PLAVIX) 75 MG tablet 30 tablet      Sig: Take 1 tablet by mouth Daily.        Pharmacy where request should be sent: [facility] [id number] [place] [id number] - [phone] [facility] [phone]      Last office visit with prescribing clinician: 4/26/2023   Last telemedicine visit with prescribing clinician: Visit date not found   Next office visit with prescribing clinician: Visit date not found     Additional details provided by patient: OUT OF PLAVIX [id number],  NEEDS ALBUTEROL INHALER BY [id number]    Does the patient have less than a 3 day supply:  [x] Yes  [] No    Would you like a call back once the refill request has been completed: [x] Yes [] No    If the office needs to give you a call back, can they leave a voicemail: [] Yes [] No    Judith Pwoers Rep   05/05/23 12:28 EDT

## 2023-05-11 RX ORDER — ALBUTEROL SULFATE 90 UG/1
2 AEROSOL, METERED RESPIRATORY (INHALATION) EVERY 4 HOURS PRN
Qty: 18 G | Refills: 0 | Status: SHIPPED | OUTPATIENT
Start: 2023-05-11

## 2023-05-11 NOTE — TELEPHONE ENCOUNTER
Caller: LAUREANO SANZ    Relationship: Emergency Contact    Best call back number: [phone]    Requested Prescriptions: ALBUTEROL INHALER       Pharmacy where request should be sent: [facility] [id number] - [phone] [facility] [phone]      Last office visit with prescribing clinician: 4/26/2023   Last telemedicine visit with prescribing clinician: 5/5/2023   Next office visit with prescribing clinician: 7/21/2023     Additional details provided by patient: PLEASE SEND REFILL. PLEASE ADD WIFE TO PROXY PER WIFE REQUEST.    Does the patient have less than a 3 day supply:  [] Yes  [x] No    Judith Nava Rep   05/11/23 10:53 EDT

## 2023-06-02 NOTE — TELEPHONE ENCOUNTER
Caller: LAUREANO SANZ    Relationship: Emergency Contact    Best call back number:  [phone]  Requested Prescriptions:   Requested Prescriptions     Pending Prescriptions Disp Refills   • carvedilol (COREG) 25 MG tablet       Sig: Take 1 tablet by mouth 2 (Two) Times a Day With Meals.   • nitroglycerin (Nitrostat) 0.4 MG SL tablet       Sig: Place 1 tablet under the tongue Every 5 (Five) Minutes As Needed for Chest Pain. Take no more than 3 doses in 15 minutes.      Pharmacy where request should be sent: [facility] [id number] [place] [address] - [phone] [facility] [phone]      Last office visit with prescribing clinician: 4/26/2023   Last telemedicine visit with prescribing clinician: Visit date not found   Next office visit with prescribing clinician: 8/1/2023     Additional details provided by patient:    Does the patient have less than a 3 day supply:  [x] Yes  [] No    Would you like a call back once the refill request has been completed: [] Yes [] No    If the office needs to give you a call back, can they leave a voicemail: [] Yes [] No

## 2023-06-05 RX ORDER — NITROGLYCERIN 0.4 MG/1
0.4 TABLET SUBLINGUAL
Qty: 10 TABLET | Refills: 1 | Status: SHIPPED | OUTPATIENT
Start: 2023-06-05

## 2023-06-05 RX ORDER — CARVEDILOL 25 MG/1
25 TABLET ORAL 2 TIMES DAILY WITH MEALS
Qty: 60 TABLET | Refills: 2 | Status: SHIPPED | OUTPATIENT
Start: 2023-06-05

## 2023-07-24 ENCOUNTER — OFFICE VISIT (OUTPATIENT)
Dept: FAMILY MEDICINE CLINIC | Facility: CLINIC | Age: 68
End: 2023-07-24
Payer: MEDICARE

## 2023-07-24 VITALS
BODY MASS INDEX: 24.74 KG/M2 | RESPIRATION RATE: 20 BRPM | SYSTOLIC BLOOD PRESSURE: 178 MMHG | DIASTOLIC BLOOD PRESSURE: 90 MMHG | OXYGEN SATURATION: 99 % | WEIGHT: 172.8 LBS | HEIGHT: 70 IN | HEART RATE: 71 BPM | TEMPERATURE: 97.3 F

## 2023-07-24 DIAGNOSIS — Z00.00 ENCOUNTER FOR SUBSEQUENT ANNUAL WELLNESS VISIT (AWV) IN MEDICARE PATIENT: Primary | ICD-10-CM

## 2023-07-24 PROCEDURE — 3077F SYST BP >= 140 MM HG: CPT | Performed by: NURSE PRACTITIONER

## 2023-07-24 PROCEDURE — 1170F FXNL STATUS ASSESSED: CPT | Performed by: NURSE PRACTITIONER

## 2023-07-24 PROCEDURE — 1159F MED LIST DOCD IN RCRD: CPT | Performed by: NURSE PRACTITIONER

## 2023-07-24 PROCEDURE — G0439 PPPS, SUBSEQ VISIT: HCPCS | Performed by: NURSE PRACTITIONER

## 2023-07-24 PROCEDURE — 3080F DIAST BP >= 90 MM HG: CPT | Performed by: NURSE PRACTITIONER

## 2023-07-24 PROCEDURE — 3044F HG A1C LEVEL LT 7.0%: CPT | Performed by: NURSE PRACTITIONER

## 2023-07-24 PROCEDURE — 1160F RVW MEDS BY RX/DR IN RCRD: CPT | Performed by: NURSE PRACTITIONER

## 2023-07-24 NOTE — PROGRESS NOTES
The ABCs of the Annual Wellness Visit  Subsequent Medicare Wellness Visit    Subjective      Louie Zamora is a 68 y.o. male who presents for a Subsequent Medicare Wellness Visit.    The following portions of the patient's history were reviewed and   updated as appropriate: allergies, current medications, past family history, past medical history, past social history, past surgical history, and problem list.    Compared to one year ago, the patient feels his physical   health is the same.    Compared to one year ago, the patient feels his mental   health is the same.    Recent Hospitalizations:  He was not admitted to the hospital during the last year.       Current Medical Providers:  Patient Care Team:  Kathia Gracia APRN as PCP - General (Family Medicine)    Outpatient Medications Prior to Visit   Medication Sig Dispense Refill    albuterol sulfate  (90 Base) MCG/ACT inhaler Inhale 2 puffs Every 4 (Four) Hours As Needed for Wheezing. 18 g 0    ALPRAZolam (XANAX) 0.5 MG tablet Take 1 tablet by mouth Daily.      carvedilol (COREG) 25 MG tablet Take 1 tablet by mouth 2 (Two) Times a Day With Meals. 60 tablet 2    citalopram (CeleXA) 40 MG tablet Take 1 tablet by mouth Daily.      clopidogrel (PLAVIX) 75 MG tablet Take 1 tablet by mouth Daily. 30 tablet 2    Eliquis 5 MG tablet tablet Take 1 tablet by mouth Every 12 (Twelve) Hours.      glucose blood test strip 1 each by Other route 3 (Three) Times a Day. Use as instructed 100 each 12    glucose monitor monitoring kit 1 each 3 (Three) Times a Day. 1 each 0    hydroCHLOROthiazide (HYDRODIURIL) 25 MG tablet Take 12.5 mg by mouth Daily.      HYDROcodone-acetaminophen (NORCO) 7.5-325 MG per tablet Take 1 tablet by mouth Every 4 (Four) Hours As Needed.      Lancets misc 1 Device 3 (Three) Times a Day. 100 each 12    lansoprazole (PREVACID) 30 MG capsule Take 1 capsule by mouth Daily. 90 capsule 0    losartan (COZAAR) 25 MG tablet Take 1 tablet by mouth Daily.       Magnesium 400 MG tablet Take 1 tablet by mouth Daily.      metFORMIN (GLUCOPHAGE) 500 MG tablet Take 1 tablet by mouth 2 (Two) Times a Day With Meals.      montelukast (SINGULAIR) 10 MG tablet Take 1 tablet by mouth Every Night.      pravastatin (PRAVACHOL) 40 MG tablet Take 1 tablet by mouth Daily.      ranolazine (RANEXA) 500 MG 12 hr tablet Take 1 tablet by mouth 2 (Two) Times a Day.      tamsulosin (FLOMAX) 0.4 MG capsule 24 hr capsule Take 1 capsule by mouth Daily. 30 capsule 5    Umeclidinium-Vilanterol (Anoro Ellipta) 62.5-25 MCG/ACT aerosol powder  inhaler Inhale 1 puff Daily.      metFORMIN (GLUCOPHAGE) 1000 MG tablet Take 500 mg by mouth 2 (Two) Times a Day With Meals.      nitroglycerin (Nitrostat) 0.4 MG SL tablet Place 1 tablet under the tongue Every 5 (Five) Minutes As Needed for Chest Pain. Take no more than 3 doses in 15 minutes. 10 tablet 1    polyethylene glycol (MiraLax) 17 g packet Take 17 g by mouth Daily. 30 each 3     No facility-administered medications prior to visit.       Opioid medication/s are on active medication list.  and I have evaluated his active treatment plan and pain score trends (see table).  There were no vitals filed for this visit.  I have reviewed the chart for potential of high risk medication and harmful drug interactions in the elderly.          Aspirin is not on active medication list.  Aspirin use is contraindicated for this patient due to: current use of clopidogrel.  .    Patient Active Problem List   Diagnosis    Elevated prostate specific antigen (PSA)    Acute prostatitis    COVID-19    BPH without obstruction/lower urinary tract symptoms    Essential hypertension    Gastroesophageal reflux disease    Type 2 diabetes mellitus with hyperglycemia, without long-term current use of insulin     Advance Care Planning   Advance Care Planning     Advance Directive is not on file.  ACP discussion was declined by the patient. Patient does not have an advance directive,  "declines further assistance.     Objective    Vitals:    23 1414 23 1426   BP: (!) 192/90 178/90   BP Location: Right arm Right arm   Patient Position: Sitting Sitting   Cuff Size: Adult Adult   Pulse: 71    Resp: 20    Temp: 97.3 °F (36.3 °C)    TempSrc: Temporal    SpO2: 99%    Weight: 78.4 kg (172 lb 12.8 oz)    Height: 177.8 cm (70\")      Estimated body mass index is 24.79 kg/m² as calculated from the following:    Height as of this encounter: 177.8 cm (70\").    Weight as of this encounter: 78.4 kg (172 lb 12.8 oz).    BMI is within normal parameters. No other follow-up for BMI required.      Does the patient have evidence of cognitive impairment?   No    Lab Results   Component Value Date    TRIG 153 (H) 2023    HDL 48 2023    LDL 81 2023    VLDL 26 2023    HGBA1C 6.00 (H) 2023          HEALTH RISK ASSESSMENT    Smoking Status:  Social History     Tobacco Use   Smoking Status Former    Packs/day: 1.50    Years: 30.00    Pack years: 45.00    Types: Cigarettes    Quit date: 2009    Years since quittin.5   Smokeless Tobacco Current    Types: Chew     Alcohol Consumption:  Social History     Substance and Sexual Activity   Alcohol Use Never     Fall Risk Screen:    ADOLFO Fall Risk Assessment was completed, and patient is at MODERATE risk for falls. Assessment completed on:2023    Depression Screenin/24/2023     2:00 PM   PHQ-2/PHQ-9 Depression Screening   Little Interest or Pleasure in Doing Things 1-->several days   Feeling Down, Depressed or Hopeless 1-->several days   Trouble Falling or Staying Asleep, or Sleeping Too Much 2-->more than half the days   Feeling Tired or Having Little Energy 2-->more than half the days   Poor Appetite or Overeating 1-->several days   Feeling Bad about Yourself - or that You are a Failure or Have Let Yourself or Your Family Down 1-->several days   Trouble Concentrating on Things, Such as Reading the Newspaper or " Watching Television 2-->more than half the days   Moving or Speaking So Slowly that Other People Could Have Noticed? Or the Opposite - Being So Fidgety 1-->several days   Thoughts that You Would be Better Off Dead or of Hurting Yourself in Some Way 0-->not at all   PHQ-9: Brief Depression Severity Measure Score 11   If You Checked Off Any Problems, How Difficult Have These Problems Made It For You to Do Your Work, Take Care of Things at Home, or Get Along with Other People? somewhat difficult       Health Habits and Functional and Cognitive Screenin/24/2023     2:00 PM   Functional & Cognitive Status   Do you have difficulty preparing food and eating? No   Do you have difficulty bathing yourself, getting dressed or grooming yourself? No   Do you have difficulty using the toilet? No   Do you have difficulty moving around from place to place? No   Do you have trouble with steps or getting out of a bed or a chair? Yes   Current Diet Well Balanced Diet   Dental Exam Not up to date        Dental Exam Comment Has dentures   Eye Exam Not up to date   Exercise (times per week) 7 times per week   Current Exercises Include Gardening   Do you need help using the phone?  No   Are you deaf or do you have serious difficulty hearing?  No   Do you need help to go to places out of walking distance? No   Do you need help shopping? No   Do you need help preparing meals?  No   Do you need help with housework?  No   Do you need help with laundry? No   Do you need help taking your medications? No   Do you need help managing money? No   Do you ever drive or ride in a car without wearing a seat belt? No   Have you felt unusual stress, anger or loneliness in the last month? Yes   Who do you live with? Spouse   If you need help, do you have trouble finding someone available to you? No   Have you been bothered in the last four weeks by sexual problems? Yes   Do you have difficulty concentrating, remembering or making decisions? Yes        Age-appropriate Screening Schedule:  Refer to the list below for future screening recommendations based on patient's age, sex and/or medical conditions. Orders for these recommended tests are listed in the plan section. The patient has been provided with a written plan.    Health Maintenance   Topic Date Due    LUNG CANCER SCREENING  Never done    DIABETIC EYE EXAM  08/30/2023 (Originally 3/23/2018)    DIABETIC FOOT EXAM  08/31/2023 (Originally 3/23/2018)    COVID-19 Vaccine (3 - Moderna series) 04/30/2024 (Originally 6/23/2021)    ZOSTER VACCINE (1 of 2) 04/30/2024 (Originally 1/8/2005)    COLORECTAL CANCER SCREENING  04/30/2024 (Originally 1955)    INFLUENZA VACCINE  10/01/2023    HEMOGLOBIN A1C  11/02/2023    LIPID PANEL  05/02/2024    URINE MICROALBUMIN  05/02/2024    ANNUAL WELLNESS VISIT  07/24/2024    TDAP/TD VACCINES (2 - Td or Tdap) 04/26/2033    HEPATITIS C SCREENING  Completed    Pneumococcal Vaccine 65+  Completed    AAA SCREEN (ONE-TIME)  Completed                  CMS Preventative Services Quick Reference  Risk Factors Identified During Encounter:    Polypharmacy: Medication List reviewed    The above risks/problems have been discussed with the patient.  Pertinent information has been shared with the patient in the After Visit Summary.    Diagnoses and all orders for this visit:    1. Encounter for subsequent annual wellness visit (AWV) in Medicare patient (Primary)  Comments:  continue current plan of care      The preventive exam has been reviewed in detail.  The patient has been fully counseled on preventative guidelines for vaccines, cancer screenings, and other health maintenance needs.   The patient has been counseled on guidelines for maintaining a lifestyle to promote good health and to minimize chronic diseases.  The patient has been assisted with scheduling these healthcare procedures for the coming year and given a written document of health maintenance and anticipatory guidance  for age with the AVS.    I spent 30 minutes caring for Louie on this date of service. This time includes time spent by me in the following activities:preparing for the visit, reviewing tests, obtaining and/or reviewing a separately obtained history, performing a medically appropriate examination and/or evaluation , counseling and educating the patient/family/caregiver, ordering medications, tests, or procedures, referring and communicating with other health care professionals , documenting information in the medical record, independently interpreting results and communicating that information with the patient/family/caregiver and care coordination      Follow Up:   Next Medicare Wellness visit to be scheduled in 1 year.      An After Visit Summary and PPPS were made available to the patient.        This document has been electronically signed by SABIHA James  July 25, 2023 05:44 EDT

## 2023-07-25 ENCOUNTER — TELEPHONE (OUTPATIENT)
Dept: UROLOGY | Facility: CLINIC | Age: 68
End: 2023-07-25

## 2023-07-25 ENCOUNTER — OFFICE VISIT (OUTPATIENT)
Dept: UROLOGY | Facility: CLINIC | Age: 68
End: 2023-07-25
Payer: MEDICARE

## 2023-07-25 ENCOUNTER — HOSPITAL ENCOUNTER (OUTPATIENT)
Dept: ULTRASOUND IMAGING | Facility: HOSPITAL | Age: 68
Discharge: HOME | End: 2023-07-25
Admitting: NURSE PRACTITIONER
Payer: MEDICARE

## 2023-07-25 VITALS
DIASTOLIC BLOOD PRESSURE: 93 MMHG | HEART RATE: 74 BPM | SYSTOLIC BLOOD PRESSURE: 165 MMHG | HEIGHT: 70 IN | BODY MASS INDEX: 24.39 KG/M2 | WEIGHT: 170.4 LBS

## 2023-07-25 DIAGNOSIS — R10.9 BILATERAL FLANK PAIN: ICD-10-CM

## 2023-07-25 DIAGNOSIS — N28.1 ACQUIRED RENAL CYST OF RIGHT KIDNEY: ICD-10-CM

## 2023-07-25 DIAGNOSIS — Z29.9 PREVENTIVE MEDICATION THERAPY NEEDED: ICD-10-CM

## 2023-07-25 DIAGNOSIS — R35.0 FREQUENCY OF MICTURITION: ICD-10-CM

## 2023-07-25 DIAGNOSIS — N28.89 RIGHT RENAL MASS: Primary | ICD-10-CM

## 2023-07-25 DIAGNOSIS — N40.0 BPH WITHOUT OBSTRUCTION/LOWER URINARY TRACT SYMPTOMS: ICD-10-CM

## 2023-07-25 DIAGNOSIS — R10.30 LOWER ABDOMINAL PAIN: ICD-10-CM

## 2023-07-25 DIAGNOSIS — R97.20 ELEVATED PROSTATE SPECIFIC ANTIGEN (PSA): ICD-10-CM

## 2023-07-25 LAB
BILIRUB BLD-MCNC: NEGATIVE MG/DL
CLARITY, POC: CLEAR
COLOR UR: YELLOW
EXPIRATION DATE: ABNORMAL
GLUCOSE UR STRIP-MCNC: NEGATIVE MG/DL
KETONES UR QL: NEGATIVE
LEUKOCYTE EST, POC: NEGATIVE
Lab: ABNORMAL
NITRITE UR-MCNC: NEGATIVE MG/ML
PH UR: 6 [PH] (ref 5–8)
PROT UR STRIP-MCNC: NEGATIVE MG/DL
RBC # UR STRIP: NEGATIVE /UL
SP GR UR: 1.01 (ref 1–1.03)
UROBILINOGEN UR QL: ABNORMAL

## 2023-07-25 PROCEDURE — 99214 OFFICE O/P EST MOD 30 MIN: CPT | Performed by: NURSE PRACTITIONER

## 2023-07-25 PROCEDURE — 3080F DIAST BP >= 90 MM HG: CPT | Performed by: NURSE PRACTITIONER

## 2023-07-25 PROCEDURE — 76775 US EXAM ABDO BACK WALL LIM: CPT

## 2023-07-25 PROCEDURE — 3077F SYST BP >= 140 MM HG: CPT | Performed by: NURSE PRACTITIONER

## 2023-07-25 PROCEDURE — 1160F RVW MEDS BY RX/DR IN RCRD: CPT | Performed by: NURSE PRACTITIONER

## 2023-07-25 PROCEDURE — 81003 URINALYSIS AUTO W/O SCOPE: CPT | Performed by: NURSE PRACTITIONER

## 2023-07-25 PROCEDURE — 87086 URINE CULTURE/COLONY COUNT: CPT | Performed by: NURSE PRACTITIONER

## 2023-07-25 PROCEDURE — 1159F MED LIST DOCD IN RCRD: CPT | Performed by: NURSE PRACTITIONER

## 2023-07-25 PROCEDURE — 76775 US EXAM ABDO BACK WALL LIM: CPT | Performed by: RADIOLOGY

## 2023-07-25 RX ORDER — PREDNISONE 10 MG/1
20 TABLET ORAL DAILY
Qty: 2 TABLET | Refills: 0 | Status: SHIPPED | OUTPATIENT
Start: 2023-07-25

## 2023-07-25 RX ORDER — DIPHENHYDRAMINE HCL 25 MG
25 CAPSULE ORAL EVERY 6 HOURS PRN
Qty: 10 CAPSULE | Refills: 0 | Status: SHIPPED | OUTPATIENT
Start: 2023-07-25

## 2023-07-25 NOTE — PROGRESS NOTES
Chief Complaint  KIDNEY LESION (FOLLOW UP right renal lesion/flank/abdominal pain/BPH)    Subjective          Louie Zamora presents to [facility] FOR RIGHT RENAL LESION/MASS/BPH WITH LUTS  History of Present Illness    Mr. Louie Zamora is a pleasant 68-year-old male who returns to clinic today for evaluation of recent multiple renal lesions noted on his right kidney concerning for renal mass, with worsening lower back pain, flank pain, lower quadrant abdominal pain.     This is also a 6-month follow-up with prior complaints of BPH with elevated PSA, lower urinary tract symptoms complicated by urinary frequency, urgency, and constant burning with urination, recurrent UTIs, chronic prostatitis-RESOLVED.  ON 06/20/23, The patient did have a CT abdomen without contrast ordered by his PCP for evaluation of An abdominal aortic aneurysm without rupture. On presentation, he had reported significant abdominal pain that had been ongoing and recently becoming very bothersome to him.     I have reviewed his CT abdomen and pelvis which showed multiple mass lesions associated with the right kidney superiorly. There is a low dense 1.3 cm mass in the mid right kidney laterally. There is a 0.6 cm mass in the inferior lateral right kidney. There is an exophytic 1.7 cm mass. No mass lesions identified within the contralateral left kidney.     Patient's most recent PSA is 1.0 and that was completed on 12/07/2022 following treatment with antibiotic therapy for prostatitis when he was initially evaluated in clinic on 11/09/2022 for his yearly follow-up of BPH. His most recent PSA was a 7.7 and that was done on 10/06/2023. His PSA prior to that was 21.3 and that was back in 2022. He has had chronic prostatitis ongoing with recurrent flareups.     On initial evaluation in clinic today, he is in no apparent discomfort. His urine dipstick is negative for leukocyte esterase, is negative for nitrite.  It is negative for gross/microscopic hematuria. His PVR is 0 mL with an IPSS score of 3. The patient also had recent blood work completed on 05/02/2023 with a BUN of 15, creatinine of 1.08 and eGFR of 74.7.    OVERALL, The patient reports he is doing well. He denies any RECENT  prostate problems. He denies any urinary frequency, urgency, or burning with urination. He denies any hematuria. He mentions he has back pain. He mentions he did not take his hydrocodone for 3 days because he could not get it at the drug store.    He reports his family doctor wanted to check his stomach for his aneurysm -AAA and found some lesions on his kidney. He mentions this is the first time he has heard anything about lesions. He denies any family history of kidney cancer. He reports his son had a nephrectomy when he was 38 years old. He denies any trauma that hit his kidney. He mentions he has hurt his back several times at work. He reports he can not have an MRI because he has a pacemaker.    Mr. Louie Zamora is a former smoker, quit over 15 years prior, but he did have a 1 pack per day history for over 30 years. He has had open heart surgery in 2009. He also reports the history of 5 heart stents. He was a pacemaker placed in 2014. He has had intermittent abdominal discomfort without nausea and occasional diarrhea. He is a diabetic and has experienced over 40-pound weight loss in the last year and this is with not even trying.    The rest of his PMHx as noted in chart    Active Ambulatory Problems     Diagnosis Date Noted    Elevated prostate specific antigen (PSA) 01/26/2021    Acute prostatitis 01/26/2021    COVID-19 09/10/2021    BPH without obstruction/lower urinary tract symptoms 11/09/2022    Essential hypertension 04/26/2023    Gastroesophageal reflux disease 04/26/2023    Type 2 diabetes mellitus with hyperglycemia, without long-term current use of insulin 04/26/2023    Right renal mass 07/25/2023    Acquired renal cyst of  "right kidney 07/25/2023    Lower abdominal pain 07/25/2023    Bilateral flank pain 07/25/2023     Resolved Ambulatory Problems     Diagnosis Date Noted    No Resolved Ambulatory Problems     Past Medical History:   Diagnosis Date    Hypercholesteremia     Hypertension       Objective   Vital Signs:   /93   Pulse 74   Ht 177.8 cm (70\")   Wt 77.3 kg (170 lb 6.4 oz)   BMI 24.45 kg/m²       ROS:   Review of Systems   Constitutional:  Positive for fatigue. Negative for activity change, appetite change, diaphoresis, fever, unexpected weight gain and unexpected weight loss.   HENT:  Negative for congestion, ear discharge, ear pain, nosebleeds, rhinorrhea, sinus pressure and sore throat.    Eyes:  Negative for blurred vision, double vision, photophobia, pain, redness and visual disturbance.   Respiratory:  Negative for apnea, cough, chest tightness, shortness of breath, wheezing and stridor.    Cardiovascular:  Negative for chest pain and palpitations.   Gastrointestinal:  Positive for abdominal pain, constipation and diarrhea. Negative for abdominal distention, nausea and vomiting.   Endocrine: Negative for polydipsia, polyphagia and polyuria.   Genitourinary:  Positive for flank pain. Negative for decreased urine volume, difficulty urinating, dysuria, frequency, hematuria, urgency and urinary incontinence.   Musculoskeletal:  Positive for back pain. Negative for arthralgias and joint swelling.   Skin:  Negative for pallor, rash and wound.   Neurological:  Positive for headache. Negative for dizziness, tremors, syncope, weakness, light-headedness, memory problem and confusion.   Hematological:  Bruises/bleeds easily.   Psychiatric/Behavioral:  Negative for behavioral problems. The patient is nervous/anxious.       Physical Exam  Constitutional:       General: He is in acute distress.      Appearance: He is well-developed. He is ill-appearing.   HENT:      Head: Normocephalic and atraumatic.      Right Ear: " External ear normal.      Left Ear: External ear normal.   Eyes:      General:         Right eye: No discharge.         Left eye: No discharge.      Conjunctiva/sclera: Conjunctivae normal.      Pupils: Pupils are equal, round, and reactive to light.   Neck:      Thyroid: No thyromegaly.      Trachea: No tracheal deviation.   Cardiovascular:      Rate and Rhythm: Normal rate and regular rhythm.      Heart sounds: No murmur heard.    No friction rub.   Pulmonary:      Effort: Pulmonary effort is normal. No respiratory distress.      Breath sounds: Normal breath sounds. No stridor.   Abdominal:      General: Bowel sounds are normal. There is distension.      Palpations: Abdomen is soft.      Tenderness: There is abdominal tenderness. There is no guarding.   Genitourinary:     Penis: Normal and uncircumcised. No tenderness or discharge.       Testes: Normal.      Rectum: Normal. Guaiac result negative.      Comments: URINE FREQ/URGENCY    Musculoskeletal:         General: Tenderness (BILATERAL FLANK PAIN/ABD/BACK PAIN) present. No deformity. Normal range of motion.      Cervical back: Normal range of motion and neck supple.   Skin:     General: Skin is warm and dry.      Capillary Refill: Capillary refill takes less than 2 seconds.      Coloration: Skin is pale.   Neurological:      Mental Status: He is alert and oriented to person, place, and time.      Cranial Nerves: No cranial nerve deficit.      Motor: Weakness present.      Coordination: Coordination normal.   Psychiatric:         Behavior: Behavior normal.         Thought Content: Thought content normal.         Judgment: Judgment normal.      Result Review :       UA          11/9/2022    13:30 12/7/2022    15:03 7/25/2023    11:51   Urinalysis   Ketones, UA Trace  Negative  Negative    Leukocytes, UA Moderate (2+)  Negative  Negative      Urine Culture          11/9/2022    13:29 12/7/2022    15:05   Urine Culture   Urine Culture >100,000 CFU/mL Klebsiella  pneumoniae ssp pneumoniae  No growth      PSA          12/7/2022    15:33   PSA   PSA 1.0         Assessment and Plan    Problem List Items Addressed This Visit          Gastrointestinal Abdominal     Lower abdominal pain    Relevant Orders    US Renal Bilateral (Completed)    CT Abdomen Pelvis With & Without Contrast    Bilateral flank pain    Relevant Orders    CT Abdomen Pelvis With & Without Contrast       Genitourinary and Reproductive     Elevated prostate specific antigen (PSA)    Relevant Orders    PSA Total+% Free (Serial)    BPH without obstruction/lower urinary tract symptoms    Relevant Orders    PSA Total+% Free (Serial)    Right renal mass - Primary    Relevant Medications    predniSONE (DELTASONE) 10 MG tablet    diphenhydrAMINE (BENADRYL) 25 mg capsule    Other Relevant Orders    US Renal Bilateral (Completed)    PSA Total+% Free (Serial)    CT Abdomen Pelvis With & Without Contrast    Acquired renal cyst of right kidney    Relevant Medications    predniSONE (DELTASONE) 10 MG tablet    diphenhydrAMINE (BENADRYL) 25 mg capsule    Other Relevant Orders    CT Abdomen Pelvis With & Without Contrast     Other Visit Diagnoses       Frequency of micturition        Relevant Orders    POC Urinalysis Dipstick, Automated (Completed)    Urine Culture - Urine, Urine, Random Void    PSA Total+% Free (Serial)    Preventive medication therapy needed        Relevant Medications    predniSONE (DELTASONE) 10 MG tablet    diphenhydrAMINE (BENADRYL) 25 mg capsule                                                      ASSESSMENT  RIGHT RENAL LESION/CYSTS BPH WITH LUTS/ ABD/FLANK/BACK PAIN  MR. Louie Zamora is a very pleasant 68-year-old male who returns to clinic today for evaluation on multiple renal lesions seen on his most recent CT scan by his PCP.  This is also 6 months follow-up for recurrent UTIs, BPH with elevated PSA/LUTS, most bothersome to which was frequency and dysuria, and constant feeling of incomplete bladder  emptying.  Patient's most recent PSA is 1.0 and that was completed on 10/7 of 2022.  His PSA prior to this was 7.7, patient had prostatitis.  His urine dipstick is complete negative for any leukocyte esterase, is negative for nitrites, is negative for gross/microscopic hematuria.  His PVR is 0 mL, with an IPSS score of 3.    On 06/21 2023, patient had a CT abdomen without contrast performed for evaluation of abdominal aortic aneurysm without rupture per his PCP with incidental finding of multiple mass lesions associated with the right kidney measuring 0.6cm to 1.7 cm with no lesions identified within the left kidney.  Her repeat renal ultrasound today confirm 2 simple appearing cortical cysts of the right kidney, with a Prominent Dromedary hump left kidney with no convincing evidence of Renal mass.    BPH: WE Discussed the pathophysiology of BPH and obstruction. We discussed the static and dynamic effects of BPH as well as using 5 alpha reductase inhibitors versus alpha blockade.  We discussed the indications for transurethral surgery as well.  And/ or other therapeutic options available including all of the newer techniques.  He has no real symptomatology referable to his prostate other than moderate enlargement.  Rather than proceed with an expensive workup he doesn't need, at this time I am recommending an alpha blocker tamsulosin 0.4 mg capsule daily, ALSO FINASTERIDE IF PSA IS WNL     WE discussed the diagnosis of Elevated prostate-specific antigen.  Now within normal limits.  However I explained the pathophysiology of PSA. AS A serine protease, and that its function in the male reproductive tract is to facilitate the liquefaction of semen.  It is for this reason the body does not want it freely floating in the serum and why it typically bbinds tightly to albumin.  We discussed why we used both a PSA free and total to determine the need for more aggressive therapy I discussed the normal range.      Renal cyst-I  discussed the Bosniak classification of renal cysts.  I discussed the strict criteria involved with making a decision regarding intervention.  We discussed the fact that this is likely a Bosniak type I cyst which has sharp distinct borders and a thin wall and no internal echoes versus a Bosniak 2 with a thicker wall and faint striations.      We discussed the risks of malignancy in these situations is essentially 0 and requires no further intervention however we discussed the fact that a Bosniak 3 has about a 28% chance of malignancy and finally a Bosniak for probably is representative of a renal cell carcinoma variant.      We discussed the fact that these are generally asymptomatic and do not require intervention and that the appropriate surgical management is a radiographic cyst puncture when causing pain or problems particularly on the left with an early satiety I am to get an MRI to define the type of cyst this is, however patient has a pacemaker and unable to undergo MRI.  We will proceed with CT renal mass protocol.                                            PLAN  BILATERAL RENAL US TODAY-Remarkable for renal cyst     We discussed the use of both an upper and lower tract investigation.  I discussed the fact that an upper tract investigation includes a  CT scan with contrast being the gold standard to diagnose the small Neoplasms Patient has been scheduled.     We REsent her urine for culture, I will call HER with results if any positive bacterial growth.    WE WILL Recheck HIS  PSA free and total,      Although he has multiple other reasons for back pain, Given family history of cancer,I would recommend a CT to further evaluate.     We will follow-up in  2 WEEKS  to review  CT scan results, and if negative,     I would recommend annual screening.  We will have her follow up in 1 year with another ultrasound.    Continue HIS HYDROCODONE as needed as prescribed by PCP     We discussed other treatment options  for HIS BACK/ flank pain with patient encouraged to continue conservative therapy alternating NSAID/Tylenol as tolerated, Also including hot baths, showers, warm compresses to lower back as tolerated. Also encouraged walking, physical therapy, light exercises as tolerated    Rest is the most important treatment in the case of flank pain. Rest and physical therapy are enough to improve minor pain. Discussed to monitor her daily routine with prevention of flank pain by: At least Drinking 8 glasses of water per day, Limiting your alcohol consumption.      Having a healthy diet containing fruits, vegetables, and a lot of fluids, Practicing safe sex.  Also maintaining proper hygiene of your body as well as the environment.     Patient is agreeable plan of care         ^ 2021 American Urological Association Guidelines Algorithm for the Workup and Management of Renal Masses     Follow up in 1 month to review CT scan.     Patient is agreeable plan.    Patient reports that he is not currently experiencing any symptoms of urinary incontinence.    BMI is within normal parameters. No other follow-up for BMI required.    RADIOLOGY (CT AND/OR KUB):    CT Abdomen and Pelvis: No results found for this or any previous visit.     CT Stone Protocol: No results found for this or any previous visit.     KUB: No results found for this or any previous visit.       LABS (3 MONTHS):    Office Visit on 07/25/2023   Component Date Value Ref Range Status    Color 07/25/2023 Yellow  Yellow, Straw, Dark Yellow, Indu Final    Clarity, UA 07/25/2023 Clear  Clear Final    Specific Gravity  07/25/2023 1.015  1.005 - 1.030 Final    pH, Urine 07/25/2023 6.0  5.0 - 8.0 Final    Leukocytes 07/25/2023 Negative  Negative Final    Nitrite, UA 07/25/2023 Negative  Negative Final    Protein, POC 07/25/2023 Negative  Negative mg/dL Final    Glucose, UA 07/25/2023 Negative  Negative mg/dL Final    Ketones, UA 07/25/2023 Negative  Negative Final     Urobilinogen, UA 07/25/2023 1 E.U./dL (A)  Normal, 0.2 E.U./dL Final    Bilirubin 07/25/2023 Negative  Negative Final    Blood, UA 07/25/2023 Negative  Negative Final    Lot Number 07/25/2023 n   Final    Expiration Date 07/25/2023 n   Final   Orders Only on 05/02/2023   Component Date Value Ref Range Status    Microalbumin, Urine 05/02/2023 2.3  mg/dL Final    Total Cholesterol 05/02/2023 155  0 - 200 mg/dL Final    Triglycerides 05/02/2023 153 (H)  0 - 150 mg/dL Final    HDL Cholesterol 05/02/2023 48  40 - 60 mg/dL Final    LDL Cholesterol  05/02/2023 81  0 - 100 mg/dL Final    VLDL Cholesterol 05/02/2023 26  5 - 40 mg/dL Final    LDL/HDL Ratio 05/02/2023 1.59   Final    Glucose 05/02/2023 148 (H)  65 - 99 mg/dL Final    BUN 05/02/2023 15  8 - 23 mg/dL Final    Creatinine 05/02/2023 1.08  0.76 - 1.27 mg/dL Final    Sodium 05/02/2023 144  136 - 145 mmol/L Final    Potassium 05/02/2023 4.4  3.5 - 5.2 mmol/L Final    Chloride 05/02/2023 104  98 - 107 mmol/L Final    CO2 05/02/2023 30.2 (H)  22.0 - 29.0 mmol/L Final    Calcium 05/02/2023 10.2  8.6 - 10.5 mg/dL Final    Total Protein 05/02/2023 7.8  6.0 - 8.5 g/dL Final    Albumin 05/02/2023 4.7  3.5 - 5.2 g/dL Final    ALT (SGPT) 05/02/2023 8  1 - 41 U/L Final    AST (SGOT) 05/02/2023 12  1 - 40 U/L Final    Alkaline Phosphatase 05/02/2023 57  39 - 117 U/L Final    Total Bilirubin 05/02/2023 0.5  0.0 - 1.2 mg/dL Final    Globulin 05/02/2023 3.1  gm/dL Final    A/G Ratio 05/02/2023 1.5  g/dL Final    BUN/Creatinine Ratio 05/02/2023 13.9  7.0 - 25.0 Final    Anion Gap 05/02/2023 9.8  5.0 - 15.0 mmol/L Final    eGFR 05/02/2023 74.7  >60.0 mL/min/1.73 Final    TSH 05/02/2023 1.120  0.270 - 4.200 uIU/mL Final    Hepatitis C Ab 05/02/2023 Non-Reactive  Non-Reactive Final    Hemoglobin A1C 05/02/2023 6.00 (H)  4.80 - 5.60 % Final    25 Hydroxy, Vitamin D 05/02/2023 30.5  30.0 - 100.0 ng/ml Final    WBC 05/02/2023 5.11  3.40 - 10.80 10*3/mm3 Final    RBC 05/02/2023 4.41  4.14  - 5.80 10*6/mm3 Final    Hemoglobin 05/02/2023 13.9  13.0 - 17.7 g/dL Final    Hematocrit 05/02/2023 43.4  37.5 - 51.0 % Final    MCV 05/02/2023 98.4 (H)  79.0 - 97.0 fL Final    MCH 05/02/2023 31.5  26.6 - 33.0 pg Final    MCHC 05/02/2023 32.0  31.5 - 35.7 g/dL Final    RDW 05/02/2023 13.1  12.3 - 15.4 % Final    RDW-SD 05/02/2023 47.8  37.0 - 54.0 fl Final    MPV 05/02/2023 11.7  6.0 - 12.0 fL Final    Platelets 05/02/2023 180  140 - 450 10*3/mm3 Final    Neutrophil % 05/02/2023 48.2  42.7 - 76.0 % Final    Lymphocyte % 05/02/2023 33.7  19.6 - 45.3 % Final    Monocyte % 05/02/2023 10.2  5.0 - 12.0 % Final    Eosinophil % 05/02/2023 6.5 (H)  0.3 - 6.2 % Final    Basophil % 05/02/2023 1.2  0.0 - 1.5 % Final    Immature Grans % 05/02/2023 0.2  0.0 - 0.5 % Final    Neutrophils, Absolute 05/02/2023 2.47  1.70 - 7.00 10*3/mm3 Final    Lymphocytes, Absolute 05/02/2023 1.72  0.70 - 3.10 10*3/mm3 Final    Monocytes, Absolute 05/02/2023 0.52  0.10 - 0.90 10*3/mm3 Final    Eosinophils, Absolute 05/02/2023 0.33  0.00 - 0.40 10*3/mm3 Final    Basophils, Absolute 05/02/2023 0.06  0.00 - 0.20 10*3/mm3 Final    Immature Grans, Absolute 05/02/2023 0.01  0.00 - 0.05 10*3/mm3 Final    nRBC 05/02/2023 0.0  0.0 - 0.2 /100 WBC Final        IPSS Questionnaire (AUA-7):  Over the past month…    1)  How often have you had a sensation of not emptying your bladder completely after you finish urinating?  0 - Not at all   2)  How often have you had to urinate again less than two hours after you finished urinating? 0 - Not at all   3)  How often have you found you stopped and started again several times when you urinated?  0 - Not at all   4) How difficult have you found it to postpone urination?  0 - Not at all   5) How often have you had a weak urinary stream?  1 - Less than 1 time in 5   6) How often have you had to push or strain to begin urination?  1 - Less than 1 time in 5   7) How many times did you most typically get up to urinate from  the time you went to bed until the time you got up in the morning?  1 - 1 time   Total Score:  3     Smoking Cessation Counseling:  Former smoker.  Patient does not currently use any tobacco products.     Follow Up   Return in about 2 weeks (around 8/8/2023) for Next scheduled follow up FOR RENAL LESION-, Review CT Scan, FOR BPH WITH LUTS/PSA/MED REFILLS/KIM.    Patient was given instructions and counseling regarding his condition or for health maintenance advice. Please see specific information pulled into the AVS if appropriate.          This document has been electronically signed by Griselda Cheng-Akwa, APRN   July 25, 2023 18:17 EDT      Dictated Utilizing Dragon Dictation: Part of this note may be an electronic transcription/translation of spoken language to printed text using the Dragon Dictation System.      Transcribed from ambient dictation for Griselda Cheng-Akwa, APRN by Edel Lockett.  07/25/23   14:26 EDT    Patient or patient representative verbalized consent to the visit recording.  I have personally performed the services described in this document as transcribed by the above individual, and it is both accurate and complete.

## 2023-07-25 NOTE — TELEPHONE ENCOUNTER
Called patient to check on him post clinic visit and to discuss renal ultrasound results which are unremarkable at this time.  Left a message on her voicemail to call me back with questions or concerns he may have.  Need to discuss proceeding to CT renal mass protocol with premeds given secondary to  the patient was ordered secondary to contrast allergy.    FINDINGS:      RIGHT: The right kidney measures 9.0 x 4.0 cm in size. No mass,  hydronephrosis, or shadowing stone. Simple appearing cortical cyst is  1.0 cm. Cortical cyst is 1.5 cm.     LEFT: The left kidney measures 12.5 x 5.8 cm in size. No mass,  hydronephrosis, or shadowing stone. There is a prominent dromedary hump  left kidney in the interpolar region without convincing evidence of  mass.     IMPRESSION:  1. 2 simple appearing cortical cysts of the right kidney.  2. Prominent dromedary hump left kidney with no convincing evidence of  renal mass.  3. Otherwise unremarkable renal ultrasound.

## 2023-07-25 NOTE — PROGRESS NOTES
"Chief Complaint  KIDNEY LESION (FOLLOW UP right renal lesion/flank/abdominal pain/BPH)    Subjective     {CC  Problem List  Visit Diagnosis   Encounters  Notes  Medications  Labs  Result Review Imaging  Media :23}     Louie Zamora presents to [facility] for RIGHT RENAL LESION/BPH WITH LUTS  History of Present Illness    Active Ambulatory Problems     Diagnosis Date Noted    Elevated prostate specific antigen (PSA) 01/26/2021    Acute prostatitis 01/26/2021    COVID-19 09/10/2021    BPH without obstruction/lower urinary tract symptoms 11/09/2022    Essential hypertension 04/26/2023    Gastroesophageal reflux disease 04/26/2023    Type 2 diabetes mellitus with hyperglycemia, without long-term current use of insulin 04/26/2023    Right renal mass 07/25/2023    Acquired renal cyst of right kidney 07/25/2023    Lower abdominal pain 07/25/2023    Bilateral flank pain 07/25/2023     Resolved Ambulatory Problems     Diagnosis Date Noted    No Resolved Ambulatory Problems     Past Medical History:   Diagnosis Date    Hypercholesteremia     Hypertension       Objective   Vital Signs:   /93   Pulse 74   Ht 177.8 cm (70\")   Wt 77.3 kg (170 lb 6.4 oz)   BMI 24.45 kg/m²       ROS:   Review of Systems   Constitutional:  Positive for activity change and fatigue. Negative for appetite change, chills, diaphoresis, fever, unexpected weight gain and unexpected weight loss.   HENT:  Negative for congestion, ear discharge, ear pain, nosebleeds, rhinorrhea, sinus pressure and sore throat.    Eyes:  Negative for blurred vision, double vision, photophobia, pain, redness and visual disturbance.   Respiratory:  Negative for apnea, cough, chest tightness, shortness of breath, wheezing and stridor.    Cardiovascular:  Negative for chest pain and palpitations.   Gastrointestinal:  Positive for abdominal pain, constipation, diarrhea and nausea. Negative for abdominal distention and " vomiting.   Endocrine: Negative for polydipsia, polyphagia and polyuria.   Genitourinary:  Positive for flank pain. Negative for decreased urine volume, difficulty urinating, discharge, dysuria, frequency, genital sores, hematuria, penile pain, penile swelling, scrotal swelling, testicular pain, urgency and urinary incontinence.   Musculoskeletal:  Positive for back pain. Negative for arthralgias and joint swelling.   Skin:  Positive for color change, dry skin, pallor and bruise. Negative for rash and wound.   Neurological:  Positive for weakness and headache. Negative for dizziness, tremors, syncope, light-headedness, memory problem and confusion.   Hematological:  Bruises/bleeds easily.   Psychiatric/Behavioral:  Positive for sleep disturbance and stress. Negative for agitation, behavioral problems and decreased concentration. The patient is nervous/anxious.       Physical Exam  Constitutional:       General: He is in acute distress.      Appearance: He is well-developed. He is ill-appearing.   HENT:      Head: Normocephalic and atraumatic.      Right Ear: External ear normal.      Left Ear: External ear normal.   Eyes:      General:         Right eye: No discharge.         Left eye: No discharge.      Conjunctiva/sclera: Conjunctivae normal.      Pupils: Pupils are equal, round, and reactive to light.   Neck:      Thyroid: No thyromegaly.      Trachea: No tracheal deviation.   Cardiovascular:      Rate and Rhythm: Normal rate and regular rhythm.      Heart sounds: No murmur heard.    No friction rub.   Pulmonary:      Effort: Pulmonary effort is normal. No respiratory distress.      Breath sounds: Normal breath sounds. No stridor.   Abdominal:      General: Bowel sounds are normal. There is distension.      Palpations: Abdomen is soft.      Tenderness: There is abdominal tenderness. There is guarding.   Genitourinary:     Penis: Normal and uncircumcised. No tenderness or discharge.       Testes: Normal.       Rectum: Normal. Guaiac result negative.   Musculoskeletal:         General: Tenderness (BILATERAL FLANK/ABD/BACK PAIN) present. No deformity. Normal range of motion.      Cervical back: Normal range of motion and neck supple.   Skin:     General: Skin is warm and dry.      Capillary Refill: Capillary refill takes less than 2 seconds.      Coloration: Skin is pale.   Neurological:      Mental Status: He is alert and oriented to person, place, and time.      Cranial Nerves: No cranial nerve deficit.      Motor: Weakness present.      Coordination: Coordination normal.   Psychiatric:         Behavior: Behavior normal.         Thought Content: Thought content normal.         Judgment: Judgment normal.      Result Review :{ Labs  Result Review  Imaging  Med Tab  Media :23}   {The following data was reviewed by (Optional):[id number]}  UA          11/9/2022    13:30 12/7/2022    15:03 7/25/2023    11:51   Urinalysis   Ketones, UA Trace  Negative  Negative    Leukocytes, UA Moderate (2+)  Negative  Negative      Urine Culture          11/9/2022    13:29 12/7/2022    15:05   Urine Culture   Urine Culture >100,000 CFU/mL Klebsiella pneumoniae ssp pneumoniae  No growth      PSA          12/7/2022    15:33   PSA   PSA 1.0      {Data reviewed (Optional):74774:::1}       Assessment and Plan {CC Problem List  Visit Diagnosis  ROS  Review (Popup)  Health Maintenance  Quality  BestPractice  Medications  SmartSets  SnapShot Encounters  Media :23}   Problem List Items Addressed This Visit          Gastrointestinal Abdominal     Lower abdominal pain    Relevant Orders    US Renal Bilateral (Completed)    CT Abdomen Pelvis With & Without Contrast    Bilateral flank pain    Relevant Orders    CT Abdomen Pelvis With & Without Contrast       Genitourinary and Reproductive     Elevated prostate specific antigen (PSA)    Relevant Orders    PSA Total+% Free (Serial)    BPH without obstruction/lower urinary tract symptoms     Relevant Orders    PSA Total+% Free (Serial)    Right renal mass - Primary    Relevant Medications    predniSONE (DELTASONE) 10 MG tablet    diphenhydrAMINE (BENADRYL) 25 mg capsule    Other Relevant Orders    US Renal Bilateral (Completed)    PSA Total+% Free (Serial)    CT Abdomen Pelvis With & Without Contrast    Acquired renal cyst of right kidney    Relevant Medications    predniSONE (DELTASONE) 10 MG tablet    diphenhydrAMINE (BENADRYL) 25 mg capsule    Other Relevant Orders    CT Abdomen Pelvis With & Without Contrast     Other Visit Diagnoses       Frequency of micturition        Relevant Orders    POC Urinalysis Dipstick, Automated (Completed)    Urine Culture - Urine, Urine, Random Void    PSA Total+% Free (Serial)    Preventive medication therapy needed        Relevant Medications    predniSONE (DELTASONE) 10 MG tablet    diphenhydrAMINE (BENADRYL) 25 mg capsule             Patient reports that he is not currently experiencing any symptoms of urinary incontinence.    BMI is within normal parameters. No other follow-up for BMI required.    RADIOLOGY (CT AND/OR KUB):    CT Abdomen and Pelvis: No results found for this or any previous visit.     CT Stone Protocol: No results found for this or any previous visit.     KUB: No results found for this or any previous visit.       LABS (3 MONTHS):    Office Visit on 07/25/2023   Component Date Value Ref Range Status    Color 07/25/2023 Yellow  Yellow, Straw, Dark Yellow, Indu Final    Clarity, UA 07/25/2023 Clear  Clear Final    Specific Gravity  07/25/2023 1.015  1.005 - 1.030 Final    pH, Urine 07/25/2023 6.0  5.0 - 8.0 Final    Leukocytes 07/25/2023 Negative  Negative Final    Nitrite, UA 07/25/2023 Negative  Negative Final    Protein, POC 07/25/2023 Negative  Negative mg/dL Final    Glucose, UA 07/25/2023 Negative  Negative mg/dL Final    Ketones, UA 07/25/2023 Negative  Negative Final    Urobilinogen, UA 07/25/2023 1 E.U./dL (A)  Normal, 0.2 E.U./dL Final     Bilirubin 07/25/2023 Negative  Negative Final    Blood, UA 07/25/2023 Negative  Negative Final    Lot Number 07/25/2023 n   Final    Expiration Date 07/25/2023 n   Final   Orders Only on 05/02/2023   Component Date Value Ref Range Status    Microalbumin, Urine 05/02/2023 2.3  mg/dL Final    Total Cholesterol 05/02/2023 155  0 - 200 mg/dL Final    Triglycerides 05/02/2023 153 (H)  0 - 150 mg/dL Final    HDL Cholesterol 05/02/2023 48  40 - 60 mg/dL Final    LDL Cholesterol  05/02/2023 81  0 - 100 mg/dL Final    VLDL Cholesterol 05/02/2023 26  5 - 40 mg/dL Final    LDL/HDL Ratio 05/02/2023 1.59   Final    Glucose 05/02/2023 148 (H)  65 - 99 mg/dL Final    BUN 05/02/2023 15  8 - 23 mg/dL Final    Creatinine 05/02/2023 1.08  0.76 - 1.27 mg/dL Final    Sodium 05/02/2023 144  136 - 145 mmol/L Final    Potassium 05/02/2023 4.4  3.5 - 5.2 mmol/L Final    Chloride 05/02/2023 104  98 - 107 mmol/L Final    CO2 05/02/2023 30.2 (H)  22.0 - 29.0 mmol/L Final    Calcium 05/02/2023 10.2  8.6 - 10.5 mg/dL Final    Total Protein 05/02/2023 7.8  6.0 - 8.5 g/dL Final    Albumin 05/02/2023 4.7  3.5 - 5.2 g/dL Final    ALT (SGPT) 05/02/2023 8  1 - 41 U/L Final    AST (SGOT) 05/02/2023 12  1 - 40 U/L Final    Alkaline Phosphatase 05/02/2023 57  39 - 117 U/L Final    Total Bilirubin 05/02/2023 0.5  0.0 - 1.2 mg/dL Final    Globulin 05/02/2023 3.1  gm/dL Final    A/G Ratio 05/02/2023 1.5  g/dL Final    BUN/Creatinine Ratio 05/02/2023 13.9  7.0 - 25.0 Final    Anion Gap 05/02/2023 9.8  5.0 - 15.0 mmol/L Final    eGFR 05/02/2023 74.7  >60.0 mL/min/1.73 Final    TSH 05/02/2023 1.120  0.270 - 4.200 uIU/mL Final    Hepatitis C Ab 05/02/2023 Non-Reactive  Non-Reactive Final    Hemoglobin A1C 05/02/2023 6.00 (H)  4.80 - 5.60 % Final    25 Hydroxy, Vitamin D 05/02/2023 30.5  30.0 - 100.0 ng/ml Final    WBC 05/02/2023 5.11  3.40 - 10.80 10*3/mm3 Final    RBC 05/02/2023 4.41  4.14 - 5.80 10*6/mm3 Final    Hemoglobin 05/02/2023 13.9  13.0 - 17.7 g/dL  Final    Hematocrit 05/02/2023 43.4  37.5 - 51.0 % Final    MCV 05/02/2023 98.4 (H)  79.0 - 97.0 fL Final    MCH 05/02/2023 31.5  26.6 - 33.0 pg Final    MCHC 05/02/2023 32.0  31.5 - 35.7 g/dL Final    RDW 05/02/2023 13.1  12.3 - 15.4 % Final    RDW-SD 05/02/2023 47.8  37.0 - 54.0 fl Final    MPV 05/02/2023 11.7  6.0 - 12.0 fL Final    Platelets 05/02/2023 180  140 - 450 10*3/mm3 Final    Neutrophil % 05/02/2023 48.2  42.7 - 76.0 % Final    Lymphocyte % 05/02/2023 33.7  19.6 - 45.3 % Final    Monocyte % 05/02/2023 10.2  5.0 - 12.0 % Final    Eosinophil % 05/02/2023 6.5 (H)  0.3 - 6.2 % Final    Basophil % 05/02/2023 1.2  0.0 - 1.5 % Final    Immature Grans % 05/02/2023 0.2  0.0 - 0.5 % Final    Neutrophils, Absolute 05/02/2023 2.47  1.70 - 7.00 10*3/mm3 Final    Lymphocytes, Absolute 05/02/2023 1.72  0.70 - 3.10 10*3/mm3 Final    Monocytes, Absolute 05/02/2023 0.52  0.10 - 0.90 10*3/mm3 Final    Eosinophils, Absolute 05/02/2023 0.33  0.00 - 0.40 10*3/mm3 Final    Basophils, Absolute 05/02/2023 0.06  0.00 - 0.20 10*3/mm3 Final    Immature Grans, Absolute 05/02/2023 0.01  0.00 - 0.05 10*3/mm3 Final    nRBC 05/02/2023 0.0  0.0 - 0.2 /100 WBC Final        IPSS Questionnaire (AUA-7):  Over the past month…    1)  How often have you had a sensation of not emptying your bladder completely after you finish urinating?  0 - Not at all   2)  How often have you had to urinate again less than two hours after you finished urinating? 0 - Not at all   3)  How often have you found you stopped and started again several times when you urinated?  0 - Not at all   4) How difficult have you found it to postpone urination?  0 - Not at all   5) How often have you had a weak urinary stream?  1 - Less than 1 time in 5   6) How often have you had to push or strain to begin urination?  1 - Less than 1 time in 5   7) How many times did you most typically get up to urinate from the time you went to bed until the time you got up in the morning?  1  - 1 time   Total Score:  3     Smoking Cessation Counseling:  Former smoker.  Patient does not currently use any tobacco products.       Follow Up {Instructions Charge Capture  Follow-up Communications :23}  Return in about 2 weeks (around 8/8/2023) for Next scheduled follow up FOR RENAL LESION-, Review CT Scan, FOR BPH WITH LUTS/PSA/MED REFILLS/KIM.    Patient was given instructions and counseling regarding his condition or for health maintenance advice. Please see specific information pulled into the AVS if appropriate.          This document has been electronically signed by Griselda Cheng-Akwa, APRN   July 25, 2023 18:07 EDT      Dictated Utilizing Dragon Dictation: Part of this note may be an electronic transcription/translation of spoken language to printed text using the Dragon Dictation System.

## 2023-07-26 LAB — BACTERIA SPEC AEROBE CULT: NO GROWTH

## 2023-08-03 ENCOUNTER — HOSPITAL ENCOUNTER (OUTPATIENT)
Dept: CT IMAGING | Facility: HOSPITAL | Age: 68
Discharge: HOME | End: 2023-08-03
Admitting: NURSE PRACTITIONER
Payer: MEDICARE

## 2023-08-03 LAB — CREAT BLDA-MCNC: 1.3 MG/DL (ref 0.6–1.3)

## 2023-08-03 PROCEDURE — 25510000001 IOPAMIDOL 61 % SOLUTION: Performed by: NURSE PRACTITIONER

## 2023-08-03 PROCEDURE — 82565 ASSAY OF CREATININE: CPT

## 2023-08-03 PROCEDURE — 74178 CT ABD&PLV WO CNTR FLWD CNTR: CPT | Performed by: RADIOLOGY

## 2023-08-03 PROCEDURE — 74178 CT ABD&PLV WO CNTR FLWD CNTR: CPT

## 2023-08-03 RX ADMIN — IOPAMIDOL 80 ML: 612 INJECTION, SOLUTION INTRAVENOUS at 13:57

## 2023-08-07 RX ORDER — CLOPIDOGREL BISULFATE 75 MG/1
TABLET ORAL
Qty: 30 TABLET | Refills: 2 | Status: SHIPPED | OUTPATIENT
Start: 2023-08-07

## 2023-08-11 ENCOUNTER — TELEPHONE (OUTPATIENT)
Dept: UROLOGY | Facility: CLINIC | Age: 68
End: 2023-08-11
Payer: MEDICARE

## 2023-08-11 NOTE — TELEPHONE ENCOUNTER
I called pt with normal ct scan and recommend to get colonoscopy pt wife is agreeable.       ----- Message from Griselda Cheng-Akwa, APRN sent at 8/9/2023 12:16 PM EDT -----  Please let patient know his CT scan results show no solid renal mass noted on his kidneys.  No kidney stones.  He has bilateral simple cyst.  there is significant stool in his colon and I am recommending he gets bottle of MiraLAX or prune juice and starts taking.  Follow-up in clinic as discussed.      Also strongly recommending colonoscopy to be scheduled due to thickening noted in his colon.  Let us know if he needs any help scheduling for that with GI.  Thank you    IMPRESSION:     1. No solid renal mass     2.Moderate stool burden. Sigmoid diverticuli without diverticulitis.  Mild thickening of the sigmoid colon wall. Recommend direct  visualization

## 2023-08-15 RX ORDER — RANOLAZINE 500 MG/1
500 TABLET, EXTENDED RELEASE ORAL 2 TIMES DAILY
Qty: 60 TABLET | Refills: 2 | Status: SHIPPED | OUTPATIENT
Start: 2023-08-15

## 2023-08-15 NOTE — TELEPHONE ENCOUNTER
Caller: VICTOR MANUEL SANZE    Relationship: Emergency Contact    Best call back number: [phone]     Requested Prescriptions:   Requested Prescriptions     Pending Prescriptions Disp Refills    ranolazine (RANEXA) 500 MG 12 hr tablet       Sig: Take 1 tablet by mouth 2 (Two) Times a Day.        Pharmacy where request should be sent: [facility] [id number] [place] [address] - [phone] [facility] [phone]      Last office visit with prescribing clinician: 7/24/2023   Last telemedicine visit with prescribing clinician: Visit date not found   Next office visit with prescribing clinician: 1/24/2024     Additional details provided by patient: PATIENT OUT OF MEDICATION    Does the patient have less than a 3 day supply:  [x] Yes  [] No    Would you like a call back once the refill request has been completed: [] Yes [x] No    If the office needs to give you a call back, can they leave a voicemail: [] Yes [x] No    Piedad Valencia   08/15/23 08:33 EDT

## 2023-08-29 DIAGNOSIS — N40.0 BPH WITHOUT OBSTRUCTION/LOWER URINARY TRACT SYMPTOMS: ICD-10-CM

## 2023-08-29 DIAGNOSIS — R33.9 INCOMPLETE EMPTYING OF BLADDER: ICD-10-CM

## 2023-08-29 DIAGNOSIS — R35.0 FREQUENCY OF MICTURITION: ICD-10-CM

## 2023-08-29 DIAGNOSIS — N40.1 BENIGN PROSTATIC HYPERPLASIA WITH INCOMPLETE BLADDER EMPTYING: ICD-10-CM

## 2023-08-29 DIAGNOSIS — R39.14 BENIGN PROSTATIC HYPERPLASIA WITH INCOMPLETE BLADDER EMPTYING: ICD-10-CM

## 2023-08-29 DIAGNOSIS — R97.20 ELEVATED PROSTATE SPECIFIC ANTIGEN (PSA): ICD-10-CM

## 2023-08-30 RX ORDER — CLOPIDOGREL BISULFATE 75 MG/1
TABLET ORAL
Qty: 30 TABLET | Refills: 2 | OUTPATIENT
Start: 2023-08-30

## 2023-08-30 RX ORDER — TAMSULOSIN HYDROCHLORIDE 0.4 MG/1
CAPSULE ORAL
Qty: 30 CAPSULE | Refills: 5 | Status: SHIPPED | OUTPATIENT
Start: 2023-08-30

## 2023-09-01 RX ORDER — CLOPIDOGREL BISULFATE 75 MG/1
75 TABLET ORAL DAILY
Qty: 90 TABLET | Refills: 0 | OUTPATIENT
Start: 2023-09-01 | End: 2023-11-30

## 2023-09-21 RX ORDER — LANSOPRAZOLE 30 MG/1
30 CAPSULE, DELAYED RELEASE ORAL DAILY
Qty: 90 CAPSULE | Refills: 0 | Status: SHIPPED | OUTPATIENT
Start: 2023-09-21

## 2023-10-11 RX ORDER — PRAVASTATIN SODIUM 40 MG
40 TABLET ORAL DAILY
Qty: 90 TABLET | Refills: 0 | Status: SHIPPED | OUTPATIENT
Start: 2023-10-11

## 2023-10-11 NOTE — TELEPHONE ENCOUNTER
Caller: LAUREANO SANZ    Relationship: Emergency Contact    Best call back number: [phone]     Requested Prescriptions:   Requested Prescriptions     Pending Prescriptions Disp Refills    metFORMIN (GLUCOPHAGE) 500 MG tablet       Sig: Take 1 tablet by mouth 2 (Two) Times a Day With Meals.    pravastatin (PRAVACHOL) 40 MG tablet 90 tablet      Sig: Take 1 tablet by mouth Daily.        Pharmacy where request should be sent: [facility] [id number] [place] [id number] - [phone] [facility] [phone]     Last office visit with prescribing clinician: 7/24/2023   Last telemedicine visit with prescribing clinician: Visit date not found   Next office visit with prescribing clinician: 1/24/2024     Additional details provided by patient: PATIENT IS COMPLETELY OUT. HIS PHARMACY SENT IT TO HIS PREVIOUS PCP IN ERROR. PLEASE ADVISE AND SEND PRESCRIPTIONS.     Does the patient have less than a 3 day supply:  [x] Yes  [] No    Would you like a call back once the refill request has been completed: [x] Yes [] No    If the office needs to give you a call back, can they leave a voicemail: [x] Yes [] No    Judith Villanueva Rep   10/11/23 08:52 EDT

## 2023-10-24 RX ORDER — HYDROCHLOROTHIAZIDE 25 MG/1
12.5 TABLET ORAL DAILY
Qty: 45 TABLET | Refills: 2 | Status: SHIPPED | OUTPATIENT
Start: 2023-10-24

## 2023-11-02 RX ORDER — CLOPIDOGREL BISULFATE 75 MG/1
75 TABLET ORAL DAILY
Qty: 30 TABLET | Refills: 2 | Status: SHIPPED | OUTPATIENT
Start: 2023-11-02

## 2023-11-02 RX ORDER — CARVEDILOL 25 MG/1
25 TABLET ORAL 2 TIMES DAILY WITH MEALS
Qty: 60 TABLET | Refills: 2 | Status: SHIPPED | OUTPATIENT
Start: 2023-11-02

## 2023-11-09 RX ORDER — NITROGLYCERIN 0.4 MG/1
0.4 TABLET SUBLINGUAL AS NEEDED
Qty: 25 TABLET | Refills: 0 | Status: SHIPPED | OUTPATIENT
Start: 2023-11-09

## 2023-11-09 NOTE — TELEPHONE ENCOUNTER
Rx Refill Note  Requested Prescriptions     Pending Prescriptions Disp Refills    nitroglycerin (NITROSTAT) 0.4 MG SL tablet [Pharmacy Med Name: NITROGLYCERIN 0.4 MG TABLET SL] 25 tablet      Sig: DISSOLVE 1 TAB UNDER TONGUE FOR CHEST PAIN - IF PAIN REMAINS AFTER 5 MIN, CALL 911 AND REPEAT DOSE. MAX 3 TABS IN 15 MINUTES      Last office visit with prescribing clinician: 7/24/2023   Last telemedicine visit with prescribing clinician: Visit date not found   Next office visit with prescribing clinician: 1/24/2024                         Would you like a call back once the refill request has been completed: [] Yes [] No    If the office needs to give you a call back, can they leave a voicemail: [] Yes [] No    Marina Nguyen RN  11/09/23, 14:06 EST

## 2024-01-10 NOTE — TELEPHONE ENCOUNTER
Rx Refill Note  Requested Prescriptions     Pending Prescriptions Disp Refills    lansoprazole (PREVACID) 30 MG capsule [Pharmacy Med Name: LANSOPRAZOLE DR 30 MG CAPSULE] 90 capsule 0     Sig: TAKE 1 CAPSULE BY MOUTH DAILY      Last office visit with prescribing clinician: 7/24/2023   Last telemedicine visit with prescribing clinician: Visit date not found   Next office visit with prescribing clinician: 1/24/2024                         Would you like a call back once the refill request has been completed: [] Yes [] No    If the office needs to give you a call back, can they leave a voicemail: [] Yes [] No    Marina Nguyen RN  01/10/24, 17:14 EST

## 2024-01-11 RX ORDER — LANSOPRAZOLE 30 MG/1
30 CAPSULE, DELAYED RELEASE ORAL DAILY
Qty: 90 CAPSULE | Refills: 0 | Status: SHIPPED | OUTPATIENT
Start: 2024-01-11

## 2024-01-22 RX ORDER — PRAVASTATIN SODIUM 40 MG
40 TABLET ORAL DAILY
Qty: 90 TABLET | Refills: 0 | Status: SHIPPED | OUTPATIENT
Start: 2024-01-22

## 2024-01-22 NOTE — TELEPHONE ENCOUNTER
Caller: LAUREANO SANZ    Relationship: Emergency Contact    Best call back number: [phone]     Requested Prescriptions:   Requested Prescriptions     Pending Prescriptions Disp Refills    metFORMIN (GLUCOPHAGE) 500 MG tablet [Pharmacy Med Name: metFORMIN  MG TABLET] 60 tablet 2     Sig: TAKE 1 TABLET BY MOUTH TWICE A DAY WITH A MEAL    pravastatin (PRAVACHOL) 40 MG tablet 90 tablet 0     Sig: Take 1 tablet by mouth Daily.        Pharmacy where request should be sent: [facility] [id number] [place] [address] - [phone] [facility] [phone]      Last office visit with prescribing clinician: 7/24/2023   Last telemedicine visit with prescribing clinician: Visit date not found   Next office visit with prescribing clinician: 1/25/2024     Additional details provided by patient: HAS NO PRAVASTATIN AND 2 DAYS METFORMON LEFT.    Does the patient have less than a 3 day supply:  [x] Yes  [] No    Would you like a call back once the refill request has been completed: [x] Yes [] No    If the office needs to give you a call back, can they leave a voicemail: [] Yes [] No    uJdith Powers Rep   01/22/24 09:14 EST

## 2024-01-25 ENCOUNTER — OFFICE VISIT (OUTPATIENT)
Dept: FAMILY MEDICINE CLINIC | Facility: CLINIC | Age: 69
End: 2024-01-25
Payer: MEDICARE

## 2024-01-25 VITALS
RESPIRATION RATE: 18 BRPM | TEMPERATURE: 98 F | DIASTOLIC BLOOD PRESSURE: 84 MMHG | HEIGHT: 70 IN | WEIGHT: 177.2 LBS | SYSTOLIC BLOOD PRESSURE: 142 MMHG | HEART RATE: 91 BPM | OXYGEN SATURATION: 98 % | BODY MASS INDEX: 25.37 KG/M2

## 2024-01-25 DIAGNOSIS — E55.9 VITAMIN D DEFICIENCY: ICD-10-CM

## 2024-01-25 DIAGNOSIS — M79.641 RIGHT HAND PAIN: ICD-10-CM

## 2024-01-25 DIAGNOSIS — R53.83 FATIGUE, UNSPECIFIED TYPE: ICD-10-CM

## 2024-01-25 DIAGNOSIS — I10 ESSENTIAL HYPERTENSION: ICD-10-CM

## 2024-01-25 DIAGNOSIS — E11.65 TYPE 2 DIABETES MELLITUS WITH HYPERGLYCEMIA, WITHOUT LONG-TERM CURRENT USE OF INSULIN: Primary | ICD-10-CM

## 2024-01-25 PROCEDURE — 1159F MED LIST DOCD IN RCRD: CPT | Performed by: NURSE PRACTITIONER

## 2024-01-25 PROCEDURE — 1160F RVW MEDS BY RX/DR IN RCRD: CPT | Performed by: NURSE PRACTITIONER

## 2024-01-25 PROCEDURE — 3079F DIAST BP 80-89 MM HG: CPT | Performed by: NURSE PRACTITIONER

## 2024-01-25 PROCEDURE — 99214 OFFICE O/P EST MOD 30 MIN: CPT | Performed by: NURSE PRACTITIONER

## 2024-01-25 PROCEDURE — 3077F SYST BP >= 140 MM HG: CPT | Performed by: NURSE PRACTITIONER

## 2024-01-25 RX ORDER — HYDROCODONE BITARTRATE AND ACETAMINOPHEN 10; 325 MG/1; MG/1
1 TABLET ORAL EVERY 6 HOURS PRN
COMMUNITY

## 2024-01-25 NOTE — PROGRESS NOTES
Chief Complaint  Follow-up (3 Month follow up Hypertension, Diabetes, GERD & Medication review. /HCM: Diabetic foot exam, Diabetic eye exam, A1c)    Subjective        Louie Zamora presents to [facility] for follow up (htn; DM; GERD).    Hypertension  This is a chronic problem. The current episode started more than 1 year ago. The problem is unchanged. The problem is uncontrolled. Associated symptoms include malaise/fatigue, peripheral edema and shortness of breath. Pertinent negatives include no anxiety, blurred vision, chest pain, headaches, neck pain, orthopnea, palpitations, PND or sweats. Risk factors for coronary artery disease include dyslipidemia, diabetes mellitus, sedentary lifestyle and male gender. Past treatments include angiotensin blockers, beta blockers and diuretics. Current antihypertension treatment includes angiotensin blockers, beta blockers and diuretics. The current treatment provides moderate improvement.   Diabetes  He presents for his follow-up diabetic visit. He has type 2 diabetes mellitus. No MedicAlert identification noted. His disease course has been stable. There are no hypoglycemic associated symptoms. Pertinent negatives for hypoglycemia include no headaches or sweats. Pertinent negatives for diabetes include no blurred vision, no chest pain, no fatigue, no foot paresthesias, no foot ulcerations, no polydipsia, no polyphagia, no polyuria, no visual change, no weakness and no weight loss. There are no hypoglycemic complications. There are no diabetic complications. Risk factors for coronary artery disease include diabetes mellitus, dyslipidemia, hypertension, male sex and sedentary lifestyle. His weight is stable. He is following a generally unhealthy diet. When asked about meal planning, he reported none. There is no change in his home blood glucose trend. His overall blood glucose range is 130-140 mg/dl.   Heartburn  He complains of abdominal pain and  "heartburn. He reports no belching, no chest pain, no choking, no coughing, no dysphagia, no early satiety, no globus sensation, no hoarse voice, no nausea, no sore throat, no stridor, no tooth decay, no water brash or no wheezing. This is a chronic problem. The current episode started more than 1 year ago. The problem occurs occasionally. The problem has been unchanged. The symptoms are aggravated by certain foods. Pertinent negatives include no anemia, fatigue, melena, muscle weakness, orthopnea or weight loss. He has tried a PPI for the symptoms. The treatment provided moderate relief.     Objective   Vital Signs:  /84   Pulse 91   Temp 98 °F (36.7 °C) (Temporal)   Resp 18   Ht 177.8 cm (70\")   Wt 80.4 kg (177 lb 3.2 oz)   SpO2 98%   BMI 25.43 kg/m²   Estimated body mass index is 25.43 kg/m² as calculated from the following:    Height as of this encounter: 177.8 cm (70\").    Weight as of this encounter: 80.4 kg (177 lb 3.2 oz).               Physical Exam  Vitals and nursing note reviewed.   Constitutional:       General: He is awake.      Appearance: Normal appearance.   HENT:      Head: Normocephalic.      Right Ear: Hearing, tympanic membrane, ear canal and external ear normal. Decreased hearing noted.      Left Ear: Hearing, tympanic membrane, ear canal and external ear normal. Decreased hearing noted.      Nose: Nose normal.      Mouth/Throat:      Lips: Pink.      Mouth: Mucous membranes are moist.      Pharynx: Oropharynx is clear.   Eyes:      General: Lids are normal.      Conjunctiva/sclera: Conjunctivae normal.      Pupils: Pupils are equal, round, and reactive to light.   Cardiovascular:      Rate and Rhythm: Normal rate and regular rhythm.      Heart sounds: Normal heart sounds.   Pulmonary:      Effort: Pulmonary effort is normal.      Breath sounds: Normal breath sounds.   Abdominal:      General: Abdomen is protuberant. Bowel sounds are normal.      Palpations: Abdomen is soft.      " Tenderness: There is no abdominal tenderness.   Musculoskeletal:         General: Normal range of motion.      Right forearm: Tenderness present.      Right wrist: Tenderness present. Decreased range of motion.      Right hand: Tenderness present. Decreased range of motion.      Cervical back: Normal range of motion and neck supple.   Skin:     General: Skin is warm and dry.      Capillary Refill: Capillary refill takes less than 2 seconds.   Neurological:      Mental Status: He is alert and oriented to person, place, and time.      Sensory: Sensation is intact.      Motor: Motor function is intact.      Coordination: Coordination is intact.      Gait: Gait is intact.   Psychiatric:         Attention and Perception: Attention and perception normal.         Mood and Affect: Affect normal. Mood is anxious.         Speech: Speech is rapid and pressured.         Behavior: Behavior normal. Behavior is cooperative.         Thought Content: Thought content normal.         Cognition and Memory: Cognition normal.         Judgment: Judgment normal.        Result Review :    The following data was reviewed by: SABIHA James on 01/25/2024:  CMP          5/2/2023    09:21 8/3/2023    13:44   CMP   Glucose 148     BUN 15     Creatinine 1.08  1.30    EGFR 74.7     Sodium 144     Potassium 4.4     Chloride 104     Calcium 10.2     Total Protein 7.8     Albumin 4.7     Globulin 3.1     Total Bilirubin 0.5     Alkaline Phosphatase 57     AST (SGOT) 12     ALT (SGPT) 8     Albumin/Globulin Ratio 1.5     BUN/Creatinine Ratio 13.9     Anion Gap 9.8       CBC w/diff          5/2/2023    09:21   CBC w/Diff   WBC 5.11    RBC 4.41    Hemoglobin 13.9    Hematocrit 43.4    MCV 98.4    MCH 31.5    MCHC 32.0    RDW 13.1    Platelets 180    Neutrophil Rel % 48.2    Immature Granulocyte Rel % 0.2    Lymphocyte Rel % 33.7    Monocyte Rel % 10.2    Eosinophil Rel % 6.5    Basophil Rel % 1.2      Lipid Panel          5/2/2023    09:21   Lipid  "Panel   Total Cholesterol 155    Triglycerides 153    HDL Cholesterol 48    VLDL Cholesterol 26    LDL Cholesterol  81    LDL/HDL Ratio 1.59      TSH          5/2/2023    09:21   TSH   TSH 1.120        Assessment and Plan     Diagnoses and all orders for this visit:    1. Type 2 diabetes mellitus with hyperglycemia, without long-term current use of insulin (Primary)  -     Comprehensive metabolic panel; Future  -     Hemoglobin A1c; Future  -     Ambulatory Referral to Podiatry    2. Essential hypertension  Assessment & Plan:  Hypertension is unchanged.  Continue current treatment regimen.  Blood pressure will be reassessed at the next regular appointment.    Pt states, \"B/P is always good at home.\"    Orders:  -     CBC w AUTO Differential; Future  -     Comprehensive metabolic panel; Future  -     Lipid panel; Future  -     TSH; Future    3. Right hand pain  -     Ambulatory Referral to Orthopedic Surgery    4. Fatigue, unspecified type  -     Vitamin B12    5. Vitamin D deficiency  -     Vitamin D,25-Hydroxy         I spent 30 minutes caring for Louie on this date of service. This time includes time spent by me in the following activities:preparing for the visit, reviewing tests, obtaining and/or reviewing a separately obtained history, performing a medically appropriate examination and/or evaluation , counseling and educating the patient/family/caregiver, ordering medications, tests, or procedures, referring and communicating with other health care professionals , documenting information in the medical record, independently interpreting results and communicating that information with the patient/family/caregiver, and care coordination    Follow Up     Return in about 6 months (around 7/25/2024) for Medicare Wellness.  Patient was given instructions and counseling regarding his condition or for health maintenance advice. Please see specific information pulled into the AVS if appropriate.         This document has " been electronically signed by SABIHA James  January 26, 2024 21:10 EST

## 2024-01-27 NOTE — ASSESSMENT & PLAN NOTE
"Hypertension is unchanged.  Continue current treatment regimen.  Blood pressure will be reassessed at the next regular appointment.    Pt states, \"B/P is always good at home.\"  "

## 2024-01-27 NOTE — PATIENT INSTRUCTIONS
"Hypertension, Adult  High blood pressure (hypertension) is when the force of blood pumping through the arteries is too strong. The arteries are the blood vessels that carry blood from the heart throughout the body. Hypertension forces the heart to work harder to pump blood and may cause arteries to become narrow or stiff. Untreated or uncontrolled hypertension can cause a heart attack, heart failure, a stroke, kidney disease, and other problems.  A blood pressure reading consists of a higher number over a lower number. Ideally, your blood pressure should be below 120/80. The first (\"top\") number is called the systolic pressure. It is a measure of the pressure in your arteries as your heart beats. The second (\"bottom\") number is called the diastolic pressure. It is a measure of the pressure in your arteries as the heart relaxes.  What are the causes?  The exact cause of this condition is not known. There are some conditions that result in or are related to high blood pressure.  What increases the risk?  Some risk factors for high blood pressure are under your control. The following factors may make you more likely to develop this condition:  Smoking.  Having type 2 diabetes mellitus, high cholesterol, or both.  Not getting enough exercise or physical activity.  Being overweight.  Having too much fat, sugar, calories, or salt (sodium) in your diet.  Drinking too much alcohol.  Some risk factors for high blood pressure may be difficult or impossible to change. Some of these factors include:  Having chronic kidney disease.  Having a family history of high blood pressure.  Age. Risk increases with age.  Race. You may be at higher risk if you are .  Gender. Men are at higher risk than women before age 45. After age 65, women are at higher risk than men.  Having obstructive sleep apnea.  Stress.  What are the signs or symptoms?  High blood pressure may not cause symptoms. Very high blood pressure " (hypertensive crisis) may cause:  Headache.  Anxiety.  Shortness of breath.  Nosebleed.  Nausea and vomiting.  Vision changes.  Severe chest pain.  Seizures.  How is this diagnosed?  This condition is diagnosed by measuring your blood pressure while you are seated, with your arm resting on a flat surface, your legs uncrossed, and your feet flat on the floor. The cuff of the blood pressure monitor will be placed directly against the skin of your upper arm at the level of your heart. It should be measured at least twice using the same arm. Certain conditions can cause a difference in blood pressure between your right and left arms.  Certain factors can cause blood pressure readings to be lower or higher than normal for a short period of time:  When your blood pressure is higher when you are in a health care provider's office than when you are at home, this is called white coat hypertension. Most people with this condition do not need medicines.  When your blood pressure is higher at home than when you are in a health care provider's office, this is called masked hypertension. Most people with this condition may need medicines to control blood pressure.  If you have a high blood pressure reading during one visit or you have normal blood pressure with other risk factors, you may be asked to:  Return on a different day to have your blood pressure checked again.  Monitor your blood pressure at home for 1 week or longer.  If you are diagnosed with hypertension, you may have other blood or imaging tests to help your health care provider understand your overall risk for other conditions.  How is this treated?  This condition is treated by making healthy lifestyle changes, such as eating healthy foods, exercising more, and reducing your alcohol intake. Your health care provider may prescribe medicine if lifestyle changes are not enough to get your blood pressure under control, and if:  Your systolic blood pressure is above  130.  Your diastolic blood pressure is above 80.  Your personal target blood pressure may vary depending on your medical conditions, your age, and other factors.  Follow these instructions at home:  Eating and drinking    Eat a diet that is high in fiber and potassium, and low in sodium, added sugar, and fat. An example eating plan is called the DASH (Dietary Approaches to Stop Hypertension) diet. To eat this way:  Eat plenty of fresh fruits and vegetables. Try to fill one half of your plate at each meal with fruits and vegetables.  Eat whole grains, such as whole-wheat pasta, brown rice, or whole-grain bread. Fill about one fourth of your plate with whole grains.  Eat or drink low-fat dairy products, such as skim milk or low-fat yogurt.  Avoid fatty cuts of meat, processed or cured meats, and poultry with skin. Fill about one fourth of your plate with lean proteins, such as fish, chicken without skin, beans, eggs, or tofu.  Avoid pre-made and processed foods. These tend to be higher in sodium, added sugar, and fat.  Reduce your daily sodium intake. Most people with hypertension should eat less than 1,500 mg of sodium a day.  Do not drink alcohol if:  Your health care provider tells you not to drink.  You are pregnant, may be pregnant, or are planning to become pregnant.  If you drink alcohol:  Limit how much you use to:  0-1 drink a day for women.  0-2 drinks a day for men.  Be aware of how much alcohol is in your drink. In the [place] one drink equals one 12 oz bottle of beer (355 mL), one 5 oz glass of wine (148 mL), or one 1½ oz glass of hard liquor (44 mL).  Lifestyle    Work with your health care provider to maintain a healthy body weight or to lose weight. Ask what an ideal weight is for you.  Get at least 30 minutes of exercise most days of the week. Activities may include walking, swimming, or biking.  Include exercise to strengthen your muscles (resistance exercise), such as Pilates or lifting weights, as  part of your weekly exercise routine. Try to do these types of exercises for 30 minutes at least 3 days a week.  Do not use any products that contain nicotine or tobacco, such as cigarettes, e-cigarettes, and chewing tobacco. If you need help quitting, ask your health care provider.  Monitor your blood pressure at home as told by your health care provider.  Keep all follow-up visits as told by your health care provider. This is important.  Medicines  Take over-the-counter and prescription medicines only as told by your health care provider. Follow directions carefully. Blood pressure medicines must be taken as prescribed.  Do not skip doses of blood pressure medicine. Doing this puts you at risk for problems and can make the medicine less effective.  Ask your health care provider about side effects or reactions to medicines that you should watch for.  Contact a health care provider if you:  Think you are having a reaction to a medicine you are taking.  Have headaches that keep coming back (recurring).  Feel dizzy.  Have swelling in your ankles.  Have trouble with your vision.  Get help right away if you:  Develop a severe headache or confusion.  Have unusual weakness or numbness.  Feel faint.  Have severe pain in your chest or abdomen.  Vomit repeatedly.  Have trouble breathing.  Summary  Hypertension is when the force of blood pumping through your arteries is too strong. If this condition is not controlled, it may put you at risk for serious complications.  Your personal target blood pressure may vary depending on your medical conditions, your age, and other factors. For most people, a normal blood pressure is less than 120/80.  Hypertension is treated with lifestyle changes, medicines, or a combination of both. Lifestyle changes include losing weight, eating a healthy, low-sodium diet, exercising more, and limiting alcohol.  This information is not intended to replace advice given to you by your health care  provider. Make sure you discuss any questions you have with your health care provider.  Document Revised: 08/28/2019 Document Reviewed: 08/28/2019  [employer] Patient Education © 2022 [employer] Diabetes Mellitus Basics  Diabetes mellitus, or diabetes, is a long-term (chronic) disease. It occurs when the body does not properly use sugar (glucose) that is released from food after you eat.  Diabetes mellitus may be caused by one or both of these problems:  Your pancreas does not make enough of a hormone called insulin.  Your body does not react in a normal way to the insulin that it makes.  Insulin lets glucose enter cells in your body. This gives you energy. If you have diabetes, glucose cannot get into cells. This causes high blood glucose (hyperglycemia).  How to treat and manage diabetes  You may need to take insulin or other diabetes medicines daily to keep your glucose in balance. If you are prescribed insulin, you will learn how to give yourself insulin by injection. You may need to adjust the amount of insulin you take based on the foods that you eat.  You will need to check your blood glucose levels using a glucose monitor as told by your health care provider. The readings can help determine if you have low or high blood glucose.  Generally, you should have these blood glucose levels:  Before meals (preprandial):  mg/dL (4.4-7.2 mmol/L).  After meals (postprandial): below 180 mg/dL (10 mmol/L).  Hemoglobin A1c (HbA1c) level: less than 7%.  Your health care provider will set treatment goals for you.  Keep all follow-up visits. This is important.  Follow these instructions at home:  Diabetes medicines  Take your diabetes medicines every day as told by your health care provider. List your diabetes medicines here:  Name of medicine: ______________________________  Amount (dose): _______________ Time (a.m./p.m.): _______________ Notes: ___________________________________  Name of medicine:  ______________________________  Amount (dose): _______________ Time (a.m./p.m.): _______________ Notes: ___________________________________  Name of medicine: ______________________________  Amount (dose): _______________ Time (a.m./p.m.): _______________ Notes: ___________________________________  Insulin  If you use insulin, list the types of insulin you use here:  Insulin type: ______________________________  Amount (dose): _______________ Time (a.m./p.m.): _______________Notes: ___________________________________  Insulin type: ______________________________  Amount (dose): _______________ Time (a.m./p.m.): _______________ Notes: ___________________________________  Insulin type: ______________________________  Amount (dose): _______________ Time (a.m./p.m.): _______________ Notes: ___________________________________  Insulin type: ______________________________  Amount (dose): _______________ Time (a.m./p.m.): _______________ Notes: ___________________________________  Insulin type: ______________________________  Amount (dose): _______________ Time (a.m./p.m.): _______________ Notes: ___________________________________  Managing blood glucose  Check your blood glucose levels using a glucose monitor as told by your health care provider.  Write down the times that you check your glucose levels here:  Time: _______________ Notes: ___________________________________  Time: _______________ Notes: ___________________________________  Time: _______________ Notes: ___________________________________  Time: _______________ Notes: ___________________________________  Time: _______________ Notes: ___________________________________  Time: _______________ Notes: ___________________________________    Low blood glucose  Low blood glucose (hypoglycemia) is when glucose is at or below 70 mg/dL (3.9 mmol/L). Symptoms may include:  Feeling:  Hungry.  Sweaty and clammy.  Irritable or easily upset.  Dizzy.  Sleepy.  Having:  A  fast heartbeat.  A headache.  A change in your vision.  Numbness around the mouth, lips, or tongue.  Having trouble with:  Moving (coordination).  Sleeping.  Treating low blood glucose  To treat low blood glucose, eat or drink something containing sugar right away. If you can think clearly and swallow safely, follow the 15:15 rule:  Take 15 grams of a fast-acting carb (carbohydrate), as told by your health care provider.  Some fast-acting carbs are:  Glucose tablets: take 3-4 tablets.  Hard candy: eat 3-5 pieces.  Fruit juice: drink 4 oz (120 mL).  Regular (not diet) soda: drink 4-6 oz (120-180 mL).  Honey or sugar: eat 1 Tbsp (15 mL).  Check your blood glucose levels 15 minutes after you take the carb.  If your glucose is still at or below 70 mg/dL (3.9 mmol/L), take 15 grams of a carb again.  If your glucose does not go above 70 mg/dL (3.9 mmol/L) after 3 tries, get help right away.  After your glucose goes back to normal, eat a meal or a snack within 1 hour.  Treating very low blood glucose  If your glucose is at or below 54 mg/dL (3 mmol/L), you have very low blood glucose (severe hypoglycemia).  This is an emergency. Do not wait to see if the symptoms will go away. Get medical help right away. Call your local emergency services (911 in the [place] Do not drive yourself to the hospital.  Questions to ask your health care provider  Should I talk with a diabetes educator?  What equipment will I need to care for myself at home?  What diabetes medicines do I need? When should I take them?  How often do I need to check my blood glucose levels?  What number can I call if I have questions?  When is my follow-up visit?  Where can I find a support group for people with diabetes?  Where to find more information  American Diabetes Association: www.diabetes.org  Association of Diabetes Care and Education Specialists: www.diabeteseducator.org  Contact a health care provider if:  Your blood glucose is at or above 240 mg/dL  (13.3 mmol/L) for 2 days in a row.  You have been sick or have had a fever for 2 days or more, and you are not getting better.  You have any of these problems for more than 6 hours:  You cannot eat or drink.  You feel nauseous.  You vomit.  You have diarrhea.  Get help right away if:  Your blood glucose is lower than 54 mg/dL (3 mmol/L).  You get confused.  You have trouble thinking clearly.  You have trouble breathing.  These symptoms may represent a serious problem that is an emergency. Do not wait to see if the symptoms will go away. Get medical help right away. Call your local emergency services (911 in the [place] Do not drive yourself to the hospital.  Summary  Diabetes mellitus is a chronic disease that occurs when the body does not properly use sugar (glucose) that is released from food after you eat.  Take insulin and diabetes medicines as told.  Check your blood glucose every day, as often as told.  Keep all follow-up visits. This is important.  This information is not intended to replace advice given to you by your health care provider. Make sure you discuss any questions you have with your health care provider.  Document Revised: 04/20/2021 Document Reviewed: 04/20/2021  [employer] Patient Education © 2022 [employer] Gastroesophageal Reflux Disease, Adult  Gastroesophageal reflux (LISETTE) happens when acid from the stomach flows up into the tube that connects the mouth and the stomach (esophagus). Normally, food travels down the esophagus and stays in the stomach to be digested. With LISETTE, food and stomach acid sometimes move back up into the esophagus.  You may have a disease called gastroesophageal reflux disease (GERD) if the reflux:  Happens often.  Causes frequent or very bad symptoms.  Causes problems such as damage to the esophagus.  When this happens, the esophagus becomes sore and swollen. Over time, GERD can make small holes (ulcers) in the lining of the esophagus.  What are the causes?  This  condition is caused by a problem with the muscle between the esophagus and the stomach. When this muscle is weak or not normal, it does not close properly to keep food and acid from coming back up from the stomach.  The muscle can be weak because of:  Tobacco use.  Pregnancy.  Having a certain type of hernia (hiatal hernia).  Alcohol use.  Certain foods and drinks, such as coffee, chocolate, onions, and peppermint.  What increases the risk?  Being overweight.  Having a disease that affects your connective tissue.  Taking NSAIDs, such a ibuprofen.  What are the signs or symptoms?  Heartburn.  Difficult or painful swallowing.  The feeling of having a lump in the throat.  A bitter taste in the mouth.  Bad breath.  Having a lot of saliva.  Having an upset or bloated stomach.  Burping.  Chest pain. Different conditions can cause chest pain. Make sure you see your doctor if you have chest pain.  Shortness of breath or wheezing.  A long-term cough or a cough at night.  Wearing away of the surface of teeth (tooth enamel).  Weight loss.  How is this treated?  Making changes to your diet.  Taking medicine.  Having surgery.  Treatment will depend on how bad your symptoms are.  Follow these instructions at home:  Eating and drinking    Follow a diet as told by your doctor. You may need to avoid foods and drinks such as:  Coffee and tea, with or without caffeine.  Drinks that contain alcohol.  Energy drinks and sports drinks.  Bubbly (carbonated) drinks or sodas.  Chocolate and cocoa.  Peppermint and mint flavorings.  Garlic and onions.  Horseradish.  Spicy and acidic foods. These include peppers, chili powder, qiu powder, vinegar, hot sauces, and BBQ sauce.  Citrus fruit juices and citrus fruits, such as oranges, abdiel, and limes.  Tomato-based foods. These include red sauce, chili, salsa, and pizza with red sauce.  Fried and fatty foods. These include donuts, french fries, potato chips, and high-fat dressings.  High-fat  meats. These include hot dogs, rib eye steak, sausage, ham, and young.  High-fat dairy items, such as whole milk, butter, and cream cheese.  Eat small meals often. Avoid eating large meals.  Avoid drinking large amounts of liquid with your meals.  Avoid eating meals during the 2-3 hours before bedtime.  Avoid lying down right after you eat.  Do not exercise right after you eat.  Lifestyle    Do not smoke or use any products that contain nicotine or tobacco. If you need help quitting, ask your doctor.  Try to lower your stress. If you need help doing this, ask your doctor.  If you are overweight, lose an amount of weight that is healthy for you. Ask your doctor about a safe weight loss goal.  General instructions  Pay attention to any changes in your symptoms.  Take over-the-counter and prescription medicines only as told by your doctor.  Do not take aspirin, ibuprofen, or other NSAIDs unless your doctor says it is okay.  Wear loose clothes. Do not wear anything tight around your waist.  Raise (elevate) the head of your bed about 6 inches (15 cm). You may need to use a wedge to do this.  Avoid bending over if this makes your symptoms worse.  Keep all follow-up visits.  Contact a doctor if:  You have new symptoms.  You lose weight and you do not know why.  You have trouble swallowing or it hurts to swallow.  You have wheezing or a cough that keeps happening.  You have a hoarse voice.  Your symptoms do not get better with treatment.  Get help right away if:  You have sudden pain in your arms, neck, jaw, teeth, or back.  You suddenly feel sweaty, dizzy, or light-headed.  You have chest pain or shortness of breath.  You vomit and the vomit is green, yellow, or black, or it looks like blood or coffee grounds.  You faint.  Your poop (stool) is red, bloody, or black.  You cannot swallow, drink, or eat.  These symptoms may represent a serious problem that is an emergency. Do not wait to see if the symptoms will go away. Get  medical help right away. Call your local emergency services (911 in the [place] Do not drive yourself to the hospital.  Summary  If a person has gastroesophageal reflux disease (GERD), food and stomach acid move back up into the esophagus and cause symptoms or problems such as damage to the esophagus.  Treatment will depend on how bad your symptoms are.  Follow a diet as told by your doctor.  Take all medicines only as told by your doctor.  This information is not intended to replace advice given to you by your health care provider. Make sure you discuss any questions you have with your health care provider.  Document Revised: 06/28/2021 Document Reviewed: 06/28/2021  [employer] Patient Education © 2022 [employer]

## 2024-02-05 RX ORDER — CARVEDILOL 25 MG/1
TABLET ORAL
Qty: 180 TABLET | Refills: 1 | Status: SHIPPED | OUTPATIENT
Start: 2024-02-05

## 2024-02-05 RX ORDER — CLOPIDOGREL BISULFATE 75 MG/1
75 TABLET ORAL DAILY
Qty: 90 TABLET | Refills: 1 | Status: SHIPPED | OUTPATIENT
Start: 2024-02-05

## 2024-02-26 ENCOUNTER — LAB (OUTPATIENT)
Dept: FAMILY MEDICINE CLINIC | Facility: CLINIC | Age: 69
End: 2024-02-26
Payer: MEDICARE

## 2024-02-26 DIAGNOSIS — N40.1 BENIGN PROSTATIC HYPERPLASIA WITH INCOMPLETE BLADDER EMPTYING: ICD-10-CM

## 2024-02-26 DIAGNOSIS — R35.0 FREQUENCY OF MICTURITION: ICD-10-CM

## 2024-02-26 DIAGNOSIS — R33.9 INCOMPLETE EMPTYING OF BLADDER: ICD-10-CM

## 2024-02-26 DIAGNOSIS — R39.14 BENIGN PROSTATIC HYPERPLASIA WITH INCOMPLETE BLADDER EMPTYING: ICD-10-CM

## 2024-02-26 DIAGNOSIS — R97.20 ELEVATED PROSTATE SPECIFIC ANTIGEN (PSA): ICD-10-CM

## 2024-02-26 DIAGNOSIS — N40.0 BPH WITHOUT OBSTRUCTION/LOWER URINARY TRACT SYMPTOMS: ICD-10-CM

## 2024-02-26 DIAGNOSIS — I10 ESSENTIAL HYPERTENSION: ICD-10-CM

## 2024-02-26 DIAGNOSIS — E11.65 TYPE 2 DIABETES MELLITUS WITH HYPERGLYCEMIA, WITHOUT LONG-TERM CURRENT USE OF INSULIN: ICD-10-CM

## 2024-02-26 LAB
ALBUMIN SERPL-MCNC: 4.5 G/DL (ref 3.5–5.2)
ALBUMIN/GLOB SERPL: 1.5 G/DL
ALP SERPL-CCNC: 56 U/L (ref 39–117)
ALT SERPL W P-5'-P-CCNC: 9 U/L (ref 1–41)
ANION GAP SERPL CALCULATED.3IONS-SCNC: 11.6 MMOL/L (ref 5–15)
AST SERPL-CCNC: 12 U/L (ref 1–40)
BASOPHILS # BLD AUTO: 0.05 10*3/MM3 (ref 0–0.2)
BASOPHILS NFR BLD AUTO: 0.7 % (ref 0–1.5)
BILIRUB SERPL-MCNC: 0.6 MG/DL (ref 0–1.2)
BUN SERPL-MCNC: 14 MG/DL (ref 8–23)
BUN/CREAT SERPL: 13.5 (ref 7–25)
CALCIUM SPEC-SCNC: 9.9 MG/DL (ref 8.6–10.5)
CHLORIDE SERPL-SCNC: 99 MMOL/L (ref 98–107)
CHOLEST SERPL-MCNC: 178 MG/DL (ref 0–200)
CO2 SERPL-SCNC: 27.4 MMOL/L (ref 22–29)
CREAT SERPL-MCNC: 1.04 MG/DL (ref 0.76–1.27)
DEPRECATED RDW RBC AUTO: 43.9 FL (ref 37–54)
EGFRCR SERPLBLD CKD-EPI 2021: 77.7 ML/MIN/1.73
EOSINOPHIL # BLD AUTO: 0.45 10*3/MM3 (ref 0–0.4)
EOSINOPHIL NFR BLD AUTO: 6.7 % (ref 0.3–6.2)
ERYTHROCYTE [DISTWIDTH] IN BLOOD BY AUTOMATED COUNT: 12.4 % (ref 12.3–15.4)
GLOBULIN UR ELPH-MCNC: 3.1 GM/DL
GLUCOSE SERPL-MCNC: 133 MG/DL (ref 65–99)
HBA1C MFR BLD: 5.9 % (ref 4.8–5.6)
HCT VFR BLD AUTO: 41.9 % (ref 37.5–51)
HDLC SERPL-MCNC: 49 MG/DL (ref 40–60)
HGB BLD-MCNC: 13.5 G/DL (ref 13–17.7)
IMM GRANULOCYTES # BLD AUTO: 0.02 10*3/MM3 (ref 0–0.05)
IMM GRANULOCYTES NFR BLD AUTO: 0.3 % (ref 0–0.5)
LDLC SERPL CALC-MCNC: 108 MG/DL (ref 0–100)
LDLC/HDLC SERPL: 2.16 {RATIO}
LYMPHOCYTES # BLD AUTO: 1.94 10*3/MM3 (ref 0.7–3.1)
LYMPHOCYTES NFR BLD AUTO: 28.9 % (ref 19.6–45.3)
MCH RBC QN AUTO: 31 PG (ref 26.6–33)
MCHC RBC AUTO-ENTMCNC: 32.2 G/DL (ref 31.5–35.7)
MCV RBC AUTO: 96.3 FL (ref 79–97)
MONOCYTES # BLD AUTO: 0.68 10*3/MM3 (ref 0.1–0.9)
MONOCYTES NFR BLD AUTO: 10.1 % (ref 5–12)
NEUTROPHILS NFR BLD AUTO: 3.57 10*3/MM3 (ref 1.7–7)
NEUTROPHILS NFR BLD AUTO: 53.3 % (ref 42.7–76)
NRBC BLD AUTO-RTO: 0 /100 WBC (ref 0–0.2)
PLATELET # BLD AUTO: 183 10*3/MM3 (ref 140–450)
PMV BLD AUTO: 10.9 FL (ref 6–12)
POTASSIUM SERPL-SCNC: 5.3 MMOL/L (ref 3.5–5.2)
PROT SERPL-MCNC: 7.6 G/DL (ref 6–8.5)
RBC # BLD AUTO: 4.35 10*6/MM3 (ref 4.14–5.8)
SODIUM SERPL-SCNC: 138 MMOL/L (ref 136–145)
TRIGL SERPL-MCNC: 117 MG/DL (ref 0–150)
TSH SERPL DL<=0.05 MIU/L-ACNC: 1.64 UIU/ML (ref 0.27–4.2)
VLDLC SERPL-MCNC: 21 MG/DL (ref 5–40)
WBC NRBC COR # BLD AUTO: 6.71 10*3/MM3 (ref 3.4–10.8)

## 2024-02-26 PROCEDURE — 82607 VITAMIN B-12: CPT | Performed by: NURSE PRACTITIONER

## 2024-02-26 PROCEDURE — 80053 COMPREHEN METABOLIC PANEL: CPT | Performed by: NURSE PRACTITIONER

## 2024-02-26 PROCEDURE — 85025 COMPLETE CBC W/AUTO DIFF WBC: CPT | Performed by: NURSE PRACTITIONER

## 2024-02-26 PROCEDURE — 82306 VITAMIN D 25 HYDROXY: CPT | Performed by: NURSE PRACTITIONER

## 2024-02-26 PROCEDURE — 83036 HEMOGLOBIN GLYCOSYLATED A1C: CPT | Performed by: NURSE PRACTITIONER

## 2024-02-26 PROCEDURE — 84443 ASSAY THYROID STIM HORMONE: CPT | Performed by: NURSE PRACTITIONER

## 2024-02-26 PROCEDURE — 80061 LIPID PANEL: CPT | Performed by: NURSE PRACTITIONER

## 2024-02-27 DIAGNOSIS — J30.89 ENVIRONMENTAL AND SEASONAL ALLERGIES: Primary | ICD-10-CM

## 2024-02-27 LAB
25(OH)D3 SERPL-MCNC: 31.8 NG/ML (ref 30–100)
VIT B12 BLD-MCNC: 499 PG/ML (ref 211–946)

## 2024-02-27 RX ORDER — CETIRIZINE HYDROCHLORIDE 10 MG/1
10 TABLET ORAL DAILY
Qty: 90 TABLET | Refills: 1 | Status: SHIPPED | OUTPATIENT
Start: 2024-02-27

## 2024-02-27 RX ORDER — TAMSULOSIN HYDROCHLORIDE 0.4 MG/1
1 CAPSULE ORAL DAILY
Qty: 90 CAPSULE | Refills: 3 | Status: SHIPPED | OUTPATIENT
Start: 2024-02-27

## 2024-04-09 NOTE — TELEPHONE ENCOUNTER
Caller: LAUREANO SANZ    Relationship: Emergency Contact    Best call back number:[phone]     Requested Prescriptions:   Requested Prescriptions     Pending Prescriptions Disp Refills    lansoprazole (PREVACID) 30 MG capsule [Pharmacy Med Name: LANSOPRAZOLE DR 30 MG CAPSULE] 90 capsule 0     Sig: TAKE 1 CAPSULE BY MOUTH DAILY        Pharmacy where request should be sent: [facility] [id number] [place] [id number] - [phone] [facility] [phone]      Last office visit with prescribing clinician: 1/25/2024   Last telemedicine visit with prescribing clinician: Visit date not found   Next office visit with prescribing clinician: Visit date not found     Does the patient have less than a 3 day supply:  [x] Yes  [] No    Would you like a call back once the refill request has been completed: [] Yes [x] No    If the office needs to give you a call back, can they leave a voicemail: [] Yes [x] No    Judith De Santiago Rep   04/09/24 10:15 EDT

## 2024-04-10 RX ORDER — LANSOPRAZOLE 30 MG/1
30 CAPSULE, DELAYED RELEASE ORAL DAILY
Qty: 90 CAPSULE | Refills: 0 | Status: SHIPPED | OUTPATIENT
Start: 2024-04-10

## 2024-05-06 RX ORDER — NITROGLYCERIN 0.4 MG/1
0.4 TABLET SUBLINGUAL AS NEEDED
Qty: 25 TABLET | Refills: 0 | Status: SHIPPED | OUTPATIENT
Start: 2024-05-06

## 2024-05-16 ENCOUNTER — TELEPHONE (OUTPATIENT)
Dept: PSYCHIATRY | Facility: CLINIC | Age: 69
End: 2024-05-16
Payer: MEDICARE

## 2024-05-16 NOTE — TELEPHONE ENCOUNTER
Received referral from [facility]. Spoke to pt wife and scheduled appt with Ophelia Dugan on 6/5/24

## 2024-06-25 ENCOUNTER — TELEMEDICINE (OUTPATIENT)
Dept: PSYCHIATRY | Facility: CLINIC | Age: 69
End: 2024-06-25
Payer: MEDICARE

## 2024-06-25 DIAGNOSIS — F41.1 GENERALIZED ANXIETY DISORDER: Primary | ICD-10-CM

## 2024-06-25 PROCEDURE — 1160F RVW MEDS BY RX/DR IN RCRD: CPT | Performed by: NURSE PRACTITIONER

## 2024-06-25 PROCEDURE — 1159F MED LIST DOCD IN RCRD: CPT | Performed by: NURSE PRACTITIONER

## 2024-06-25 PROCEDURE — 90792 PSYCH DIAG EVAL W/MED SRVCS: CPT | Performed by: NURSE PRACTITIONER

## 2024-06-25 RX ORDER — ALPRAZOLAM 0.5 MG/1
0.5 TABLET ORAL 2 TIMES DAILY PRN
Qty: 60 TABLET | Refills: 2 | Status: SHIPPED | OUTPATIENT
Start: 2024-06-25

## 2024-06-25 RX ORDER — CITALOPRAM 40 MG/1
40 TABLET ORAL DAILY
Qty: 30 TABLET | Refills: 6 | Status: SHIPPED | OUTPATIENT
Start: 2024-06-25

## 2024-06-25 NOTE — PROGRESS NOTES
Patient Name: Louie Zamora  MRN: [id number]   :  1955     Referring Physician: No primary care provider on file.    This provider is located in her home office through the [facility] (through [facility]), [address], [address] using a secure [employer] Video Visit through [employer]. Patient is being seen remotely via telehealth at their home address in [place], and stated they are in a secure environment for this session. The patient's condition being diagnosed/treated is appropriate for telemedicine. The provider identified herself as well as her credentials.   The patient, and/or patients guardian, consent to be seen remotely, and when consent is given they understand that the consent allows for patient identifiable information to be sent to a third party as needed.   They may refuse to be seen remotely at any time. The electronic data is encrypted and password protected, and the patient and/or guardian has been advised of the potential risks to privacy not withstanding such measures.    You have chosen to receive care through a telehealth visit.  Do you consent to use a video/audio connection for your medical care today? Yes    Chief Complaint:     ICD-10-CM ICD-9-CM   1. Generalized anxiety disorder  F41.1 300.02       HPI:   Louie Zamora is a 69 y.o. male who is here today for initial evaluation of Anxiety .  Patient states he has chronic pain and anxiety.  His right arm was crushed in a coal mine.  Worked in a coal mine for several years.  Now he finds.  Has tried Wellbutrin to help him quit smoking.  States it actually made him want to smoke more.  Has tried BuSpar for anxiety before.  In  he had a heart attack.  Patient states citalopram keeps him from being hateful.    Past Medical History:   Past Medical History:   Diagnosis Date    Hypercholesteremia     Hypertension        Past Surgical History:   Past Surgical History:   Procedure Laterality  Date    CARDIAC SURGERY      PACEMAKER IMPLANTATION         Social History:   Social History     Socioeconomic History    Marital status:    Tobacco Use    Smoking status: Former     Current packs/day: 0.00     Average packs/day: 1.5 packs/day for 30.0 years (45.0 ttl pk-yrs)     Types: Cigarettes     Start date: 1/1/1979     Quit date: 1/1/2009     Years since quitting: 15.5    Smokeless tobacco: Current     Types: Chew   Vaping Use    Vaping status: Never Used   Substance and Sexual Activity    Alcohol use: Never    Drug use: Never    Sexual activity: Defer       Family History:  Family History   Problem Relation Age of Onset    Heart disease Father     Hypertension Mother     Heart disease Mother     Cancer Mother        Allergy:  Allergies   Allergen Reactions    Contrast Dye (Echo Or Unknown Ct/Mr) Rash    Iodine Rash       Current Medications:   Current Outpatient Medications   Medication Sig Dispense Refill    ALPRAZolam (XANAX) 0.5 MG tablet Take 1 tablet by mouth 2 (Two) Times a Day As Needed for Anxiety. 60 tablet 2    citalopram (CeleXA) 40 MG tablet Take 1 tablet by mouth Daily. 30 tablet 6    albuterol sulfate  (90 Base) MCG/ACT inhaler Inhale 2 puffs Every 4 (Four) Hours As Needed for Wheezing. 18 g 0    carvedilol (COREG) 25 MG tablet TAKE 1 TABLET BY MOUTH TWICE A DAY WITH A MEAL 180 tablet 1    cetirizine (zyrTEC) 10 MG tablet Take 1 tablet by mouth Daily. 90 tablet 1    clopidogrel (PLAVIX) 75 MG tablet TAKE 1 TABLET BY MOUTH DAILY 90 tablet 1    diphenhydrAMINE (BENADRYL) 25 mg capsule Take 1 capsule by mouth Every 6 (Six) Hours As Needed for Itching. START NIGHT PRIOR TO CT EXAM, AND ALSO 30 MINUTES PRIOR TO CONTRAST 10 capsule 0    Eliquis 5 MG tablet tablet Take 1 tablet by mouth Every 12 (Twelve) Hours.      glucose blood test strip 1 each by Other route 3 (Three) Times a Day. Use as instructed 100 each 12    glucose monitor monitoring kit 1 each 3 (Three) Times a Day. 1 each 0     hydroCHLOROthiazide (HYDRODIURIL) 25 MG tablet Take 0.5 tablets by mouth Daily. 45 tablet 2    HYDROcodone-acetaminophen (NORCO)  MG per tablet Take 1 tablet by mouth Every 6 (Six) Hours As Needed.      Lancets misc 1 Device 3 (Three) Times a Day. 100 each 12    lansoprazole (PREVACID) 30 MG capsule TAKE 1 CAPSULE BY MOUTH DAILY 90 capsule 0    losartan (COZAAR) 25 MG tablet Take 1 tablet by mouth Daily.      Magnesium 400 MG tablet Take 1 tablet by mouth Daily.      metFORMIN (GLUCOPHAGE) 500 MG tablet TAKE 1 TABLET BY MOUTH TWICE A DAY WITH A MEAL 60 tablet 2    montelukast (SINGULAIR) 10 MG tablet Take 1 tablet by mouth Every Night.      nitroglycerin (NITROSTAT) 0.4 MG SL tablet Place 1 tablet under the tongue As Needed for Chest Pain.  - IF PAIN REMAINS AFTER 5 MIN CALL 911 AND REPEAT DOSE MAX 3 TABS IN 15 MINUTES 25 tablet 0    pravastatin (PRAVACHOL) 40 MG tablet Take 1 tablet by mouth Daily. 90 tablet 0    ranolazine (RANEXA) 500 MG 12 hr tablet Take 1 tablet by mouth 2 (Two) Times a Day. 60 tablet 2    tamsulosin (FLOMAX) 0.4 MG capsule 24 hr capsule TAKE 1 CAPSULE BY MOUTH DAILY 90 capsule 3    Umeclidinium-Vilanterol (Anoro Ellipta) 62.5-25 MCG/ACT aerosol powder  inhaler Inhale 1 puff Daily.       No current facility-administered medications for this visit.       Lab Results:   No visits with results within 3 Month(s) from this visit.   Latest known visit with results is:   Lab on 02/26/2024   Component Date Value Ref Range Status    Hemoglobin A1C 02/26/2024 5.90 (H)  4.80 - 5.60 % Final    TSH 02/26/2024 1.640  0.270 - 4.200 uIU/mL Final    Total Cholesterol 02/26/2024 178  0 - 200 mg/dL Final    Triglycerides 02/26/2024 117  0 - 150 mg/dL Final    HDL Cholesterol 02/26/2024 49  40 - 60 mg/dL Final    LDL Cholesterol  02/26/2024 108 (H)  0 - 100 mg/dL Final    VLDL Cholesterol 02/26/2024 21  5 - 40 mg/dL Final    LDL/HDL Ratio 02/26/2024 2.16   Final    Glucose 02/26/2024 133 (H)  65 - 99  mg/dL Final    BUN 02/26/2024 14  8 - 23 mg/dL Final    Creatinine 02/26/2024 1.04  0.76 - 1.27 mg/dL Final    Sodium 02/26/2024 138  136 - 145 mmol/L Final    Potassium 02/26/2024 5.3 (H)  3.5 - 5.2 mmol/L Final    Chloride 02/26/2024 99  98 - 107 mmol/L Final    CO2 02/26/2024 27.4  22.0 - 29.0 mmol/L Final    Calcium 02/26/2024 9.9  8.6 - 10.5 mg/dL Final    Total Protein 02/26/2024 7.6  6.0 - 8.5 g/dL Final    Albumin 02/26/2024 4.5  3.5 - 5.2 g/dL Final    ALT (SGPT) 02/26/2024 9  1 - 41 U/L Final    AST (SGOT) 02/26/2024 12  1 - 40 U/L Final    Alkaline Phosphatase 02/26/2024 56  39 - 117 U/L Final    Total Bilirubin 02/26/2024 0.6  0.0 - 1.2 mg/dL Final    Globulin 02/26/2024 3.1  gm/dL Final    A/G Ratio 02/26/2024 1.5  g/dL Final    BUN/Creatinine Ratio 02/26/2024 13.5  7.0 - 25.0 Final    Anion Gap 02/26/2024 11.6  5.0 - 15.0 mmol/L Final    eGFR 02/26/2024 77.7  >60.0 mL/min/1.73 Final    WBC 02/26/2024 6.71  3.40 - 10.80 10*3/mm3 Final    RBC 02/26/2024 4.35  4.14 - 5.80 10*6/mm3 Final    Hemoglobin 02/26/2024 13.5  13.0 - 17.7 g/dL Final    Hematocrit 02/26/2024 41.9  37.5 - 51.0 % Final    MCV 02/26/2024 96.3  79.0 - 97.0 fL Final    MCH 02/26/2024 31.0  26.6 - 33.0 pg Final    MCHC 02/26/2024 32.2  31.5 - 35.7 g/dL Final    RDW 02/26/2024 12.4  12.3 - 15.4 % Final    RDW-SD 02/26/2024 43.9  37.0 - 54.0 fl Final    MPV 02/26/2024 10.9  6.0 - 12.0 fL Final    Platelets 02/26/2024 183  140 - 450 10*3/mm3 Final    Neutrophil % 02/26/2024 53.3  42.7 - 76.0 % Final    Lymphocyte % 02/26/2024 28.9  19.6 - 45.3 % Final    Monocyte % 02/26/2024 10.1  5.0 - 12.0 % Final    Eosinophil % 02/26/2024 6.7 (H)  0.3 - 6.2 % Final    Basophil % 02/26/2024 0.7  0.0 - 1.5 % Final    Immature Grans % 02/26/2024 0.3  0.0 - 0.5 % Final    Neutrophils, Absolute 02/26/2024 3.57  1.70 - 7.00 10*3/mm3 Final    Lymphocytes, Absolute 02/26/2024 1.94  0.70 - 3.10 10*3/mm3 Final    Monocytes, Absolute 02/26/2024 0.68  0.10 - 0.90  10*3/mm3 Final    Eosinophils, Absolute 02/26/2024 0.45 (H)  0.00 - 0.40 10*3/mm3 Final    Basophils, Absolute 02/26/2024 0.05  0.00 - 0.20 10*3/mm3 Final    Immature Grans, Absolute 02/26/2024 0.02  0.00 - 0.05 10*3/mm3 Final    nRBC 02/26/2024 0.0  0.0 - 0.2 /100 WBC Final       Review of Symptoms:   Review of Systems   Constitutional:  Negative for activity change, appetite change, fatigue, unexpected weight gain and unexpected weight loss.   Respiratory:  Negative for shortness of breath and wheezing.    Gastrointestinal:  Negative for constipation, diarrhea, nausea and vomiting.   Musculoskeletal:  Negative for gait problem.   Skin:  Negative for dry skin and rash.   Neurological:  Negative for dizziness, speech difficulty, weakness, light-headedness, headache, memory problem and confusion.   Psychiatric/Behavioral:  Negative for agitation, behavioral problems, decreased concentration, dysphoric mood, hallucinations, self-injury, sleep disturbance, suicidal ideas, negative for hyperactivity, depressed mood and stress. The patient is not nervous/anxious.        Physical Exam:   Physical Exam  Constitutional:       General: He is not in acute distress.     Appearance: He is well-developed. He is not diaphoretic.   HENT:      Head: Normocephalic and atraumatic.   Eyes:      Conjunctiva/sclera: Conjunctivae normal.   Pulmonary:      Effort: Pulmonary effort is normal. No respiratory distress.   Musculoskeletal:         General: Normal range of motion.      Cervical back: Full passive range of motion without pain and normal range of motion.   Neurological:      Mental Status: He is alert and oriented to person, place, and time.   Psychiatric:         Mood and Affect: Mood is not anxious or depressed. Affect is not labile, blunt, angry or inappropriate.         Speech: Speech is not rapid and pressured or tangential.         Behavior: Behavior normal. Behavior is not agitated, slowed, aggressive, withdrawn,  hyperactive or combative. Behavior is cooperative.         Thought Content: Thought content normal. Thought content is not paranoid or delusional. Thought content does not include homicidal or suicidal ideation. Thought content does not include homicidal or suicidal plan.         Judgment: Judgment normal.       There were no vitals taken for this visit.  There is no height or weight on file to calculate BMI. Video appt unable to obtain.     Mental Status Exam:   Appearance: appropriate  Hygiene:   good  Cooperation:  Cooperative  Eye Contact:  Good  Psychomotor Behavior:  Appropriate  Mood:anxious  Affect:  Appropriate  Hopelessness: Denies  Speech:  Normal  Thought Process:  Goal directed  Thought Content:  Normal  Suicidal:  None  Homicidal:  None  Hallucinations:  None  Delusion:  None  Memory:  Intact  Orientation:  Person, Place, Time, and Situation  Reliability:  good  Insight:  Good  Judgement:  Good  Impulse Control:  Good    PHQ-9 Depression Screening  Little interest or pleasure in doing things?     Feeling down, depressed, or hopeless?     Trouble falling or staying asleep, or sleeping too much?     Feeling tired or having little energy?     Poor appetite or overeating?     Feeling bad about yourself - or that you are a failure or have let yourself or your family down?     Trouble concentrating on things, such as reading the newspaper or watching television?     Moving or speaking so slowly that other people could have noticed? Or the opposite - being so fidgety or restless that you have been moving around a lot more than usual?     Thoughts that you would be better off dead, or of hurting yourself in some way?     PHQ-9 Total Score     If you checked off any problems, how difficult have these problems made it for you to do your work, take care of things at home, or get along with other people?        REVIEWED [facility] # [id number]     Assessment/Plan:   Diagnoses and all orders for this visit:    1.  Generalized anxiety disorder (Primary)  -     ALPRAZolam (XANAX) 0.5 MG tablet; Take 1 tablet by mouth 2 (Two) Times a Day As Needed for Anxiety.  Dispense: 60 tablet; Refill: 2  -     citalopram (CeleXA) 40 MG tablet; Take 1 tablet by mouth Daily.  Dispense: 30 tablet; Refill: 6    Patient has been on current medications for several years.  Trying to wean him off would be more of a detriment than the benefit.  We will continue medication as ordered.    A psychological evaluation was conducted in order to assess past and current level of functioning. Areas assessed included, but were not limited to: perception of social support, perception of ability to face and deal with challenges in life (positive functioning), anxiety symptoms, depressive symptoms, perspective on beliefs/belief system, coping skills for stress, intelligence level,  Therapeutic rapport was established. Interventions conducted today were geared towards incorporating medication management along with support for continued therapy. Education was also provided as to the med management with this provider and what to expect in subsequent sessions.    We discussed risks, benefits,goals and side effects of the above medication and the patient was agreeable with the plan.Patient was educated on the importance of compliance with treatment and follow-up appointments. Patient is aware to contact the [facility] [phone] with any worsening of symptoms. To call for questions or concerns and return early if necessary. Patent is agreeable to go to the Emergency Department or call 911 should they begin SI/HI.     Part of this note may be an electronic transcription/translation of spoken language to printed text using the Dragon Dictation System.    Return in about 4 weeks (around 7/23/2024) for Follow Up 30 min, Video visit.    SABIHA Pantoja

## 2024-07-30 RX ORDER — NITROGLYCERIN 0.4 MG/1
0.4 TABLET SUBLINGUAL AS NEEDED
Qty: 25 TABLET | Refills: 0 | Status: SHIPPED | OUTPATIENT
Start: 2024-07-30

## 2024-07-30 NOTE — TELEPHONE ENCOUNTER
Rx Refill Note  Requested Prescriptions     Pending Prescriptions Disp Refills    nitroglycerin (NITROSTAT) 0.4 MG SL tablet [Pharmacy Med Name: NITROGLYCERIN 0.4 MG TABLET SL] 25 tablet 0     Sig: DISSOLVE 1 TAB UNDER TONGUE FOR CHEST PAIN - IF PAIN REMAINS AFTER 5 MIN, CALL 911 AND REPEAT DOSE. MAX 3 TABS IN 15 MINUTES      Last office visit with prescribing clinician: Visit date not found   Last telemedicine visit with prescribing clinician: Visit date not found   Next office visit with prescribing clinician: Visit date not found                         Would you like a call back once the refill request has been completed: [] Yes [] No    If the office needs to give you a call back, can they leave a voicemail: [] Yes [] No    Shannon Hodges, SYED  07/30/24, 12:26 EDT

## 2024-08-28 ENCOUNTER — TELEMEDICINE (OUTPATIENT)
Dept: PSYCHIATRY | Facility: CLINIC | Age: 69
End: 2024-08-28
Payer: MEDICARE

## 2024-08-28 DIAGNOSIS — F41.1 GENERALIZED ANXIETY DISORDER: Primary | ICD-10-CM

## 2024-08-28 PROCEDURE — 99213 OFFICE O/P EST LOW 20 MIN: CPT | Performed by: NURSE PRACTITIONER

## 2024-08-28 PROCEDURE — 1160F RVW MEDS BY RX/DR IN RCRD: CPT | Performed by: NURSE PRACTITIONER

## 2024-08-28 PROCEDURE — 1159F MED LIST DOCD IN RCRD: CPT | Performed by: NURSE PRACTITIONER

## 2024-08-28 RX ORDER — ALPRAZOLAM 0.5 MG
0.5 TABLET ORAL 2 TIMES DAILY PRN
Qty: 60 TABLET | Refills: 2 | Status: SHIPPED | OUTPATIENT
Start: 2024-08-28

## 2024-10-07 ENCOUNTER — LAB (OUTPATIENT)
Dept: LAB | Facility: HOSPITAL | Age: 69
End: 2024-10-07
Payer: MEDICARE

## 2024-10-07 ENCOUNTER — OFFICE VISIT (OUTPATIENT)
Dept: NEUROLOGY | Facility: CLINIC | Age: 69
End: 2024-10-07
Payer: MEDICARE

## 2024-10-07 VITALS
DIASTOLIC BLOOD PRESSURE: 88 MMHG | HEART RATE: 78 BPM | OXYGEN SATURATION: 100 % | HEIGHT: 70 IN | SYSTOLIC BLOOD PRESSURE: 142 MMHG | TEMPERATURE: 97.1 F | BODY MASS INDEX: 26.4 KG/M2 | RESPIRATION RATE: 14 BRPM | WEIGHT: 184.4 LBS

## 2024-10-07 DIAGNOSIS — G31.84 MCI (MILD COGNITIVE IMPAIRMENT): ICD-10-CM

## 2024-10-07 DIAGNOSIS — E53.8 FOLATE DEFICIENCY: ICD-10-CM

## 2024-10-07 DIAGNOSIS — R41.89 SUBJECTIVE MEMORY COMPLAINTS: ICD-10-CM

## 2024-10-07 DIAGNOSIS — G47.10 HYPERSOMNIA: ICD-10-CM

## 2024-10-07 DIAGNOSIS — G31.84 MCI (MILD COGNITIVE IMPAIRMENT): Primary | ICD-10-CM

## 2024-10-07 LAB
AMMONIA BLD-SCNC: <10 UMOL/L (ref 16–60)
FOLATE SERPL-MCNC: 4.36 NG/ML (ref 4.78–24.2)
TSH SERPL DL<=0.05 MIU/L-ACNC: 1.36 UIU/ML (ref 0.27–4.2)
VIT B12 BLD-MCNC: 525 PG/ML (ref 211–946)

## 2024-10-07 PROCEDURE — 82175 ASSAY OF ARSENIC: CPT

## 2024-10-07 PROCEDURE — 83655 ASSAY OF LEAD: CPT

## 2024-10-07 PROCEDURE — 83921 ORGANIC ACID SINGLE QUANT: CPT

## 2024-10-07 PROCEDURE — 1159F MED LIST DOCD IN RCRD: CPT | Performed by: NURSE PRACTITIONER

## 2024-10-07 PROCEDURE — 82746 ASSAY OF FOLIC ACID SERUM: CPT

## 2024-10-07 PROCEDURE — 84443 ASSAY THYROID STIM HORMONE: CPT

## 2024-10-07 PROCEDURE — 3079F DIAST BP 80-89 MM HG: CPT | Performed by: NURSE PRACTITIONER

## 2024-10-07 PROCEDURE — 83825 ASSAY OF MERCURY: CPT

## 2024-10-07 PROCEDURE — 82607 VITAMIN B-12: CPT

## 2024-10-07 PROCEDURE — 1160F RVW MEDS BY RX/DR IN RCRD: CPT | Performed by: NURSE PRACTITIONER

## 2024-10-07 PROCEDURE — 86592 SYPHILIS TEST NON-TREP QUAL: CPT

## 2024-10-07 PROCEDURE — 82140 ASSAY OF AMMONIA: CPT

## 2024-10-07 PROCEDURE — 3077F SYST BP >= 140 MM HG: CPT | Performed by: NURSE PRACTITIONER

## 2024-10-07 PROCEDURE — 99204 OFFICE O/P NEW MOD 45 MIN: CPT | Performed by: NURSE PRACTITIONER

## 2024-10-07 PROCEDURE — 82300 ASSAY OF CADMIUM: CPT

## 2024-10-07 PROCEDURE — 36415 COLL VENOUS BLD VENIPUNCTURE: CPT

## 2024-10-07 RX ORDER — DONEPEZIL HYDROCHLORIDE 5 MG/1
5 TABLET, FILM COATED ORAL NIGHTLY
Qty: 30 TABLET | Refills: 2 | Status: SHIPPED | OUTPATIENT
Start: 2024-10-07 | End: 2025-10-07

## 2024-10-07 NOTE — PROGRESS NOTES
"     New Patient Office Visit      Patient Name: Louie Zamora  : 1955   MRN: [id number]     Referring Physician: Kathia Gracia APRN    Chief Complaint:    Chief Complaint   Patient presents with    Establish Care     AMS       History of Present Illness: Louie Zamora is a 69 y.o. male who is here today to establish care with Neurology. He is a former pt of Neurology ([facility]) in [place] for right sided bells palsy. She was referred to us from PCP ([facility]) for AMS.     HE has been struggling with periods of short term memory decline. Onset gradual over the past 5 years. He has no memory decline with long term history. Friends and family have noticed decline. Wife manages household finances. He is still driving. Self ADLs. Wife is managing household cooking, cleaning and shopping. Sometimes struggles with word finding. 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 No mood or personality changes. Does co hypersomnia with excessive AM fatigue.     SOCIAL:  Happily , Brenda. Three adult children (Herbert, Alhaji, Rodo). He is a retired . HS graduate. No  service. No tobacco or nicotine. No ETOH. No recreational drugs or cannabis.     H Neuro:  NONE    Recent Imaging: NONE    Pertinent Medical History: HTN, DM2, GERD, chronic low back pain, BPH, No TBI, [place] Palsy (Left) since , PM    Subjective      Review of Systems:   Review of Systems   Constitutional: Negative.    HENT: Negative.     Eyes: Negative.    Respiratory: Negative.     Cardiovascular: Negative.    Gastrointestinal: Negative.    Endocrine: Negative.    Genitourinary: Negative.    Musculoskeletal: Negative.    Skin: Negative.    Allergic/Immunologic: Negative.    Neurological:  Positive for memory problem.   Hematological: Negative.    Psychiatric/Behavioral: Negative.     All other systems reviewed and are negative.      Past Medical History:   Past Medical History:   Diagnosis Date    " Hypercholesteremia     Hypertension        Past Surgical History:   Past Surgical History:   Procedure Laterality Date    CARDIAC SURGERY      PACEMAKER IMPLANTATION         Family History:   Family History   Problem Relation Age of Onset    Heart disease Father     Hypertension Mother     Heart disease Mother     Cancer Mother        Social History:   Social History     Socioeconomic History    Marital status:    Tobacco Use    Smoking status: Former     Current packs/day: 0.00     Average packs/day: 1.5 packs/day for 30.0 years (45.0 ttl pk-yrs)     Types: Cigarettes     Start date: 1/1/1979     Quit date: 1/1/2009     Years since quitting: 15.7    Smokeless tobacco: Current     Types: Chew   Vaping Use    Vaping status: Never Used   Substance and Sexual Activity    Alcohol use: Never    Drug use: Never    Sexual activity: Defer       Medications:     Current Outpatient Medications:     albuterol sulfate  (90 Base) MCG/ACT inhaler, Inhale 2 puffs Every 4 (Four) Hours As Needed for Wheezing., Disp: 18 g, Rfl: 0    ALPRAZolam (XANAX) 0.5 MG tablet, Take 1 tablet by mouth 2 (Two) Times a Day As Needed for Anxiety., Disp: 60 tablet, Rfl: 2    carvedilol (COREG) 25 MG tablet, TAKE 1 TABLET BY MOUTH TWICE A DAY WITH A MEAL, Disp: 180 tablet, Rfl: 1    cetirizine (zyrTEC) 10 MG tablet, Take 1 tablet by mouth Daily., Disp: 90 tablet, Rfl: 1    citalopram (CeleXA) 40 MG tablet, Take 1 tablet by mouth Daily., Disp: 30 tablet, Rfl: 6    clopidogrel (PLAVIX) 75 MG tablet, TAKE 1 TABLET BY MOUTH DAILY, Disp: 90 tablet, Rfl: 1    diphenhydrAMINE (BENADRYL) 25 mg capsule, Take 1 capsule by mouth Every 6 (Six) Hours As Needed for Itching. START NIGHT PRIOR TO CT EXAM, AND ALSO 30 MINUTES PRIOR TO CONTRAST, Disp: 10 capsule, Rfl: 0    Eliquis 5 MG tablet tablet, Take 1 tablet by mouth Every 12 (Twelve) Hours., Disp: , Rfl:     glucose blood test strip, 1 each by Other route 3 (Three) Times a Day. Use as instructed,  Disp: 100 each, Rfl: 12    glucose monitor monitoring kit, 1 each 3 (Three) Times a Day., Disp: 1 each, Rfl: 0    hydroCHLOROthiazide (HYDRODIURIL) 25 MG tablet, Take 0.5 tablets by mouth Daily., Disp: 45 tablet, Rfl: 2    HYDROcodone-acetaminophen (NORCO)  MG per tablet, Take 1 tablet by mouth Every 6 (Six) Hours As Needed., Disp: , Rfl:     Lancets misc, 1 Device 3 (Three) Times a Day., Disp: 100 each, Rfl: 12    lansoprazole (PREVACID) 30 MG capsule, TAKE 1 CAPSULE BY MOUTH DAILY, Disp: 90 capsule, Rfl: 0    losartan (COZAAR) 25 MG tablet, Take 1 tablet by mouth Daily., Disp: , Rfl:     Magnesium 400 MG tablet, Take 1 tablet by mouth Daily., Disp: , Rfl:     metFORMIN (GLUCOPHAGE) 500 MG tablet, TAKE 1 TABLET BY MOUTH TWICE A DAY WITH A MEAL, Disp: 60 tablet, Rfl: 2    montelukast (SINGULAIR) 10 MG tablet, Take 1 tablet by mouth Every Night., Disp: , Rfl:     nitroglycerin (NITROSTAT) 0.4 MG SL tablet, DISSOLVE 1 TAB UNDER TONGUE FOR CHEST PAIN - IF PAIN REMAINS AFTER 5 MIN, CALL 911 AND REPEAT DOSE. MAX 3 TABS IN 15 MINUTES, Disp: 25 tablet, Rfl: 0    pravastatin (PRAVACHOL) 40 MG tablet, Take 1 tablet by mouth Daily., Disp: 90 tablet, Rfl: 0    ranolazine (RANEXA) 500 MG 12 hr tablet, Take 1 tablet by mouth 2 (Two) Times a Day., Disp: 60 tablet, Rfl: 2    tamsulosin (FLOMAX) 0.4 MG capsule 24 hr capsule, TAKE 1 CAPSULE BY MOUTH DAILY, Disp: 90 capsule, Rfl: 3    Umeclidinium-Vilanterol (Anoro Ellipta) 62.5-25 MCG/ACT aerosol powder  inhaler, Inhale 1 puff Daily., Disp: , Rfl:     donepezil (Aricept) 5 MG tablet, Take 1 tablet by mouth Every Night., Disp: 30 tablet, Rfl: 2    Allergies:   Allergies   Allergen Reactions    Contrast Dye (Echo Or Unknown Ct/Mr) Rash    Iodine Rash       Objective     Physical Exam:  Vital Signs:   Vitals:    10/07/24 1003   BP: 142/88   BP Location: Right arm   Patient Position: Sitting   Cuff Size: Adult   Pulse: 78   Resp: 14   Temp: 97.1 °F (36.2 °C)   TempSrc: Infrared  "  SpO2: 100%   Weight: 83.6 kg (184 lb 6.4 oz)   Height: 177.8 cm (70\")   PainSc:   7   PainLoc: Back     Body mass index is 26.46 kg/m².     Physical Exam  Vitals and nursing note reviewed.   Constitutional:       General: He is not in acute distress.     Appearance: Normal appearance.   HENT:      Head: Normocephalic.      Nose: Nose normal.      Mouth/Throat:      Mouth: Mucous membranes are moist.      Pharynx: Oropharynx is clear.   Eyes:      Extraocular Movements: Extraocular movements intact.      Conjunctiva/sclera: Conjunctivae normal.   Musculoskeletal:      Cervical back: Normal range of motion and neck supple.   Skin:     General: Skin is warm.      Capillary Refill: Capillary refill takes less than 2 seconds.   Neurological:      Mental Status: He is alert and oriented to person, place, and time.      Cranial Nerves: Cranial nerves 2-12 are intact. No cranial nerve deficit.      Sensory: No sensory deficit.      Motor: No weakness.      Coordination: Coordination normal. Finger-Nose-Finger Test and Romberg Test normal.      Gait: Gait is intact. Gait normal.      Deep Tendon Reflexes: Reflexes normal.      Reflex Scores:       Tricep reflexes are 2+ on the right side and 2+ on the left side.       Bicep reflexes are 2+ on the right side and 2+ on the left side.       Patellar reflexes are 2+ on the right side and 2+ on the left side.  Psychiatric:         Mood and Affect: Mood normal.         Speech: Speech normal.         Behavior: Behavior normal.         Neurologic Exam     Mental Status   Oriented to person, place, and time.   Oriented to country, city, area, street and number.   Oriented to year, month, date, day and season.   Registration: recalls 3 of 3 objects. Recall of objects at 5 minutes: 0/3. Follows 3 step commands.   Attention: normal. Concentration: normal.   Speech: speech is normal   Level of consciousness: alert  Knowledge: good. Able to perform simple calculations.   Able to name " object. Able to read. Able to repeat. Able to write. Normal comprehension.   MMSE 26/30     Cranial Nerves   Cranial nerves II through XII intact.     CN VII   Left facial weakness: peripheral  Left taste: abnormal    Motor Exam   Muscle bulk: normal  Overall muscle tone: normal  Right arm tone: normal  Left arm tone: normal  Right arm pronator drift: absent  Left arm pronator drift: absent  Right leg tone: normal  Left leg tone: normal    Strength   Right biceps: 4/5  Left biceps: 5/5  Right triceps: 4/5  Left triceps: 5/5  Right quadriceps: 5/5  Left quadriceps: 5/5  Right hamstrin/5  Left hamstrin/5    Sensory Exam   Light touch normal.     Gait, Coordination, and Reflexes     Gait  Gait: normal    Coordination   Romberg: negative  Finger to nose coordination: normal    Tremor   Resting tremor: absent    Reflexes   Right biceps: 2+  Left biceps: 2+  Right triceps: 2+  Left triceps: 2+  Right patellar: 2+  Left patellar: 2+  Right Roach: absent  Left Roach: absent      PHQ-9 Total Score:       Assessment / Plan      Assessment/Plan:   Diagnoses and all orders for this visit:    1. MCI (mild cognitive impairment) (Primary)  -     US Carotid Bilateral; Future  -     Home Sleep Study; Future  -     CT Head With & Without Contrast; Future  -     donepezil (Aricept) 5 MG tablet; Take 1 tablet by mouth Every Night.  Dispense: 30 tablet; Refill: 2  -     Vitamin B12; Future  -     Folate; Future  -     Methylmalonic Acid, Serum; Future  -     Heavy Metals Profile II, Blood; Future  -     RPR; Future  -     TSH; Future  -     Ammonia; Future    2. Subjective memory complaints    3. Hypersomnia  -     Home Sleep Study; Future         Follow Up:   No follow-ups on file.    Anticipatory guidance and safety reviewed  Patient education provided  MMSE 26/30  Begin Aricept 5 mg Daily; SE reviewed   PHQ9 Score 6  Home Sleep Study  [place] Score 0  FAST 2  Labs: Vitamin B12, folate, MMA, heavy metal panel, RPR, TSH,  and ammonia  CDUS r/o stenosis  CT Head W/WO r/o demyelinating disease    RTC PRN or within 12 weeks or sooner with issues     SABIHA Li  [facility]

## 2024-10-08 LAB — RPR SER QL: NORMAL

## 2024-10-08 RX ORDER — LANOLIN ALCOHOL/MO/W.PET/CERES
400 CREAM (GRAM) TOPICAL DAILY
Qty: 90 TABLET | Refills: 0 | Status: SHIPPED | OUTPATIENT
Start: 2024-10-08

## 2024-10-09 LAB
ARSENIC BLD-MCNC: 2 UG/L (ref 0–9)
CADMIUM BLD-MCNC: <0.5 UG/L (ref 0–1.2)
LEAD BLDV-MCNC: 1.1 UG/DL (ref 0–3.4)
MERCURY BLD-MCNC: <1 UG/L (ref 0–14.9)

## 2024-10-12 LAB — METHYLMALONATE SERPL-SCNC: 126 NMOL/L (ref 0–378)

## 2024-10-15 ENCOUNTER — PATIENT ROUNDING (BHMG ONLY) (OUTPATIENT)
Dept: NEUROLOGY | Facility: CLINIC | Age: 69
End: 2024-10-15
Payer: MEDICARE

## 2024-10-16 ENCOUNTER — TELEPHONE (OUTPATIENT)
Dept: NEUROLOGY | Facility: CLINIC | Age: 69
End: 2024-10-16
Payer: MEDICARE

## 2024-10-16 NOTE — TELEPHONE ENCOUNTER
CALLED AND SPOKE WITH PTS WIFE, FAXED ORDERS TO [facility]. PT WILL CALL ALIA TO HAVE THE HOME SLEEP STUDY MAILED.

## 2024-10-16 NOTE — TELEPHONE ENCOUNTER
Provider: JUSTEN    Caller: LAUREANO    Relationship to Patient: WIFE    Phone Number: [phone]     Reason for Call: PT'S WIFE CALLED AND STATES THAT THEY HAVE NOT HEARD FROM ANYONE TO SCHEDULE PT FOR CT. WIFE IS WANTING TO KNOW IF ORDERS CAN BE SENT TO [facility] AS THIS IS WHERE PT RECEIVES ALL IMAGING AS THIS LOCATION IS CLOSER TO PT.    PLEASE REVIEW AND ADVISE  THANK YOU

## 2024-10-17 NOTE — TELEPHONE ENCOUNTER
Caller: LAUREANO SANZ    Relationship: Emergency Contact    Best call back number: [phone]    Who are you requesting to speak with (clinical staff, provider,  specific staff member): STAFF    Do you know the name of the person who called: PAPA    What was the call regarding: [facility] SAID THEY DON'T HAVE THE ORDER FOR THE CT SCAN BUT THEY DID SCHEDULE THE CAROTID US.    Is it okay if the provider responds through MyChart: NO

## 2024-12-02 DIAGNOSIS — F41.1 GENERALIZED ANXIETY DISORDER: ICD-10-CM

## 2024-12-02 RX ORDER — ALPRAZOLAM 0.5 MG
0.5 TABLET ORAL 2 TIMES DAILY PRN
Qty: 14 TABLET | Refills: 0 | Status: SHIPPED | OUTPATIENT
Start: 2024-12-02

## 2024-12-02 NOTE — TELEPHONE ENCOUNTER
Regular provider is out of the office. Pt is prescribed hydrocodone by another provider. Please advise pt that there is a FDA Black Box Warning in regards to concomitant use of benzodiazepines and opioids. Potential adverse effects include unusual dizziness, lightheadedness, extreme sleepiness and can lead to respiratory failure and death. Please advise pt that it is not recommended to take these medications concurrently. This APRN will send in a 7-day supply to cover for regular provider while they are out of the office to prevent withdrawal symptoms.

## 2024-12-10 DIAGNOSIS — G31.84 MCI (MILD COGNITIVE IMPAIRMENT): Primary | ICD-10-CM

## 2024-12-12 DIAGNOSIS — F41.1 GENERALIZED ANXIETY DISORDER: ICD-10-CM

## 2024-12-12 RX ORDER — ALPRAZOLAM 0.5 MG
0.5 TABLET ORAL 2 TIMES DAILY PRN
Qty: 60 TABLET | Refills: 0 | Status: SHIPPED | OUTPATIENT
Start: 2024-12-12

## 2024-12-12 NOTE — TELEPHONE ENCOUNTER
Reviewed [facility] #[id number].  Patient last filled a 7-day supply of alprazolam on December 7, 2024.  Patient's regular provider is out of office.  We will prescribe a month's worth of alprazolam 0.5 mg twice daily # 60 in regular provider's absence to prevent withdrawal.

## 2025-01-08 ENCOUNTER — TELEMEDICINE (OUTPATIENT)
Dept: NEUROLOGY | Facility: CLINIC | Age: 70
End: 2025-01-08
Payer: MEDICARE

## 2025-01-08 DIAGNOSIS — E53.8 FOLATE DEFICIENCY: ICD-10-CM

## 2025-01-08 DIAGNOSIS — G31.84 MCI (MILD COGNITIVE IMPAIRMENT): ICD-10-CM

## 2025-01-08 PROCEDURE — 99214 OFFICE O/P EST MOD 30 MIN: CPT | Performed by: NURSE PRACTITIONER

## 2025-01-08 RX ORDER — FOLIC ACID 0.4 MG
400 TABLET ORAL DAILY
Qty: 90 TABLET | Refills: 1 | Status: SHIPPED | OUTPATIENT
Start: 2025-01-08

## 2025-01-08 RX ORDER — DONEPEZIL HYDROCHLORIDE 10 MG/1
10 TABLET, FILM COATED ORAL NIGHTLY
Qty: 90 TABLET | Refills: 1 | Status: SHIPPED | OUTPATIENT
Start: 2025-01-08

## 2025-01-08 NOTE — PROGRESS NOTES
"     Follow Up Office Visit      Patient Name: Louie Zamora  : 1955   MRN: [id number]     Chief Complaint:    Chief Complaint   Patient presents with    Memory Loss       History of Present Illness: Louie Zamora is a 70 y.o. male who is here today to follow up with neurology for MCI and Hypersomnia. He was last seen in clinic 10/24 (Flavia). This is a telehealth encounter.     He was ordered CT Head, CDUS- insurance would not approve, and a Home Sleep Study. He has been taking Aricpet 5 mg Daily and reports good tolerance and compliance. He reports improved memory. He was started on Folic Acid supplement and is also taking this without issue. Wife manages household finances. He is still driving. Self ADLs. Wife is managing household cooking, cleaning and shopping. Sometimes struggles with word finding. 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 No mood or personality changes.     Recent Imaging:     CT Head WO  + mild atrophy with minimal microvascular change.      Pertinent Medical History: HTN, DM2, GERD, chronic low back pain, BPH, No TBI, [place] Palsy (Left) since , PM    Subjective      Review of Systems:   Review of Systems   Constitutional: Negative.    HENT: Negative.     Eyes: Negative.    Respiratory: Negative.     Cardiovascular: Negative.    Gastrointestinal: Negative.    Endocrine: Negative.    Genitourinary: Negative.    Musculoskeletal: Negative.    Skin: Negative.    Allergic/Immunologic: Negative.    Neurological:  Positive for memory problem.   Hematological: Negative.    Psychiatric/Behavioral: Negative.     All other systems reviewed and are negative.      I have reviewed and the following portions of the patient's history were updated as appropriate: past family history, past medical history, past social history, past surgical history and problem list.    Medications:     Current Outpatient Medications:     donepezil (Aricept) 10 MG tablet, " Take 1 tablet by mouth Every Night., Disp: 90 tablet, Rfl: 1    folic acid (FOLVITE) 400 MCG tablet, Take 1 tablet by mouth Daily., Disp: 90 tablet, Rfl: 1    albuterol sulfate  (90 Base) MCG/ACT inhaler, Inhale 2 puffs Every 4 (Four) Hours As Needed for Wheezing., Disp: 18 g, Rfl: 0    ALPRAZolam (XANAX) 0.5 MG tablet, Take 1 tablet by mouth 2 (Two) Times a Day As Needed for Anxiety., Disp: 60 tablet, Rfl: 0    carvedilol (COREG) 25 MG tablet, TAKE 1 TABLET BY MOUTH TWICE A DAY WITH A MEAL, Disp: 180 tablet, Rfl: 1    cetirizine (zyrTEC) 10 MG tablet, Take 1 tablet by mouth Daily., Disp: 90 tablet, Rfl: 1    citalopram (CeleXA) 40 MG tablet, Take 1 tablet by mouth Daily., Disp: 30 tablet, Rfl: 6    clopidogrel (PLAVIX) 75 MG tablet, TAKE 1 TABLET BY MOUTH DAILY, Disp: 90 tablet, Rfl: 1    diphenhydrAMINE (BENADRYL) 25 mg capsule, Take 1 capsule by mouth Every 6 (Six) Hours As Needed for Itching. START NIGHT PRIOR TO CT EXAM, AND ALSO 30 MINUTES PRIOR TO CONTRAST, Disp: 10 capsule, Rfl: 0    Eliquis 5 MG tablet tablet, Take 1 tablet by mouth Every 12 (Twelve) Hours., Disp: , Rfl:     glucose blood test strip, 1 each by Other route 3 (Three) Times a Day. Use as instructed, Disp: 100 each, Rfl: 12    glucose monitor monitoring kit, 1 each 3 (Three) Times a Day., Disp: 1 each, Rfl: 0    hydroCHLOROthiazide (HYDRODIURIL) 25 MG tablet, Take 0.5 tablets by mouth Daily., Disp: 45 tablet, Rfl: 2    HYDROcodone-acetaminophen (NORCO)  MG per tablet, Take 1 tablet by mouth Every 6 (Six) Hours As Needed., Disp: , Rfl:     Lancets misc, 1 Device 3 (Three) Times a Day., Disp: 100 each, Rfl: 12    lansoprazole (PREVACID) 30 MG capsule, TAKE 1 CAPSULE BY MOUTH DAILY, Disp: 90 capsule, Rfl: 0    losartan (COZAAR) 25 MG tablet, Take 1 tablet by mouth Daily., Disp: , Rfl:     Magnesium 400 MG tablet, Take 1 tablet by mouth Daily., Disp: , Rfl:     metFORMIN (GLUCOPHAGE) 500 MG tablet, TAKE 1 TABLET BY MOUTH TWICE A DAY  WITH A MEAL, Disp: 60 tablet, Rfl: 2    montelukast (SINGULAIR) 10 MG tablet, Take 1 tablet by mouth Every Night., Disp: , Rfl:     nitroglycerin (NITROSTAT) 0.4 MG SL tablet, DISSOLVE 1 TAB UNDER TONGUE FOR CHEST PAIN - IF PAIN REMAINS AFTER 5 MIN, CALL 911 AND REPEAT DOSE. MAX 3 TABS IN 15 MINUTES, Disp: 25 tablet, Rfl: 0    pravastatin (PRAVACHOL) 40 MG tablet, Take 1 tablet by mouth Daily., Disp: 90 tablet, Rfl: 0    ranolazine (RANEXA) 500 MG 12 hr tablet, Take 1 tablet by mouth 2 (Two) Times a Day., Disp: 60 tablet, Rfl: 2    tamsulosin (FLOMAX) 0.4 MG capsule 24 hr capsule, TAKE 1 CAPSULE BY MOUTH DAILY, Disp: 90 capsule, Rfl: 3    Umeclidinium-Vilanterol (Anoro Ellipta) 62.5-25 MCG/ACT aerosol powder  inhaler, Inhale 1 puff Daily., Disp: , Rfl:     Allergies:   Allergies   Allergen Reactions    Contrast Dye (Echo Or Unknown Ct/Mr) Rash    Iodine Rash       Objective     Physical Exam:  Vital Signs: There were no vitals filed for this visit.  There is no height or weight on file to calculate BMI.    Physical Exam  Vitals and nursing note reviewed.   Constitutional:       General: He is not in acute distress.     Appearance: Normal appearance.   HENT:      Head: Normocephalic.      Nose: Nose normal.      Mouth/Throat:      Mouth: Mucous membranes are moist.      Pharynx: Oropharynx is clear.   Eyes:      Extraocular Movements: Extraocular movements intact.      Conjunctiva/sclera: Conjunctivae normal.   Musculoskeletal:      Cervical back: Normal range of motion.   Neurological:      Mental Status: He is alert and oriented to person, place, and time.   Psychiatric:         Mood and Affect: Mood normal.         Behavior: Behavior normal.         Neurological Exam  Mental Status  Alert. Oriented to person, place, and time.    Cranial Nerves  CN III, IV, VI: Extraocular movements intact bilaterally.       Assessment / Plan      Assessment/Plan:   Diagnoses and all orders for this visit:    1. MCI (mild cognitive  impairment)  -     donepezil (Aricept) 10 MG tablet; Take 1 tablet by mouth Every Night.  Dispense: 90 tablet; Refill: 1    2. Folate deficiency  -     folic acid (FOLVITE) 400 MCG tablet; Take 1 tablet by mouth Daily.  Dispense: 90 tablet; Refill: 1         Follow Up:   Return in about 6 months (around 7/8/2025), or if symptoms worsen or fail to improve.    Anticipatory guidance and safety reviewed  Patient education provided  Continue/increase Aricept 10 mg Daily; SE reviewed   Continue Folic Acid  FAST 2  Reviewed CT scan and discussed  Declined Sleep Study     RTC PRN or within 6 months or sooner with issues (MMSE and Labs at FU)    [place] Encounter for 21 min with >50% of encounter spent counseling and coordinating care     Ena Dsouza, DNP, APRN, FNP-C  [facility]

## 2025-01-21 ENCOUNTER — TELEMEDICINE (OUTPATIENT)
Dept: PSYCHIATRY | Facility: CLINIC | Age: 70
End: 2025-01-21
Payer: COMMERCIAL

## 2025-01-21 DIAGNOSIS — F41.1 GENERALIZED ANXIETY DISORDER: Primary | ICD-10-CM

## 2025-01-21 RX ORDER — CITALOPRAM HYDROBROMIDE 40 MG/1
40 TABLET ORAL DAILY
Qty: 30 TABLET | Refills: 6 | Status: SHIPPED | OUTPATIENT
Start: 2025-01-21

## 2025-01-21 RX ORDER — ALPRAZOLAM 0.5 MG
0.5 TABLET ORAL 2 TIMES DAILY PRN
Qty: 60 TABLET | Refills: 2 | Status: SHIPPED | OUTPATIENT
Start: 2025-01-21

## 2025-01-21 NOTE — PROGRESS NOTES
Patient Name: Louie Zamora  MRN: [id number]   :  1955     This provider is located at her home office through the [facility] (through [facility]), [address], [address] using a secure [employer] Video Visit through [email or web address]. Patient is being seen remotely via telehealth at their home address in [place], and stated they are in a secure environment for this session. The patient's condition being diagnosed/treated is appropriate for telemedicine. The provider identified herself as well as her credentials.   The patient, and/or patients guardian, consent to be seen remotely, and when consent is given they understand that the consent allows for patient identifiable information to be sent to a third party as needed.   They may refuse to be seen remotely at any time. The electronic data is encrypted and password protected, and the patient and/or guardian has been advised of the potential risks to privacy not withstanding such measures.    You have chosen to receive care through a telehealth visit.  Do you consent to use a video/audio connection for your medical care today? Yes    Chief Complaint:      ICD-10-CM ICD-9-CM   1. Generalized anxiety disorder  F41.1 300.02       History of Present Illness: Louie Zamora is a 70 y.o. male who presents today for medication management follow up. He reports his mood is good, however he has had some depressive symptoms related to the cold weather. He says he is able to manage symptoms with his current medications. Denies any issues with sleep at this time.    The following portions of the patient's history were reviewed and updated as appropriate: allergies, current medications, past family history, past medical history, past social history, past surgical history, and problem list.    Review of Systems;;  Review of Systems   Constitutional:  Negative for activity change, appetite change, fatigue, unexpected weight gain and  unexpected weight loss.   Respiratory:  Negative for shortness of breath and wheezing.    Gastrointestinal:  Negative for constipation, diarrhea, nausea and vomiting.   Musculoskeletal:  Negative for gait problem.   Skin:  Negative for dry skin and rash.   Neurological:  Negative for dizziness, speech difficulty, weakness, light-headedness, headache, memory problem and confusion.   Psychiatric/Behavioral:  Negative for agitation, behavioral problems, decreased concentration, dysphoric mood, hallucinations, self-injury, sleep disturbance, suicidal ideas, negative for hyperactivity, depressed mood and stress. The patient is not nervous/anxious.        Physical Exam;;  Physical Exam  Constitutional:       General: He is not in acute distress.     Appearance: He is well-developed. He is not diaphoretic.   HENT:      Head: Normocephalic and atraumatic.   Eyes:      Conjunctiva/sclera: Conjunctivae normal.   Pulmonary:      Effort: Pulmonary effort is normal. No respiratory distress.   Musculoskeletal:         General: Normal range of motion.      Cervical back: Full passive range of motion without pain and normal range of motion.   Neurological:      Mental Status: He is alert and oriented to person, place, and time.   Psychiatric:         Mood and Affect: Mood is not anxious or depressed. Affect is not labile, blunt, angry or inappropriate.         Speech: Speech is not rapid and pressured or tangential.         Behavior: Behavior normal. Behavior is not agitated, slowed, aggressive, withdrawn, hyperactive or combative. Behavior is cooperative.         Thought Content: Thought content normal. Thought content is not paranoid or delusional. Thought content does not include homicidal or suicidal ideation. Thought content does not include homicidal or suicidal plan.         Judgment: Judgment normal.       There were no vitals taken for this visit.  There is no height or weight on file to calculate BMI.    Current  Medications;;    Current Outpatient Medications:     ALPRAZolam (XANAX) 0.5 MG tablet, Take 1 tablet by mouth 2 (Two) Times a Day As Needed for Anxiety., Disp: 60 tablet, Rfl: 2    citalopram (CeleXA) 40 MG tablet, Take 1 tablet by mouth Daily., Disp: 30 tablet, Rfl: 6    albuterol sulfate  (90 Base) MCG/ACT inhaler, Inhale 2 puffs Every 4 (Four) Hours As Needed for Wheezing., Disp: 18 g, Rfl: 0    carvedilol (COREG) 25 MG tablet, TAKE 1 TABLET BY MOUTH TWICE A DAY WITH A MEAL, Disp: 180 tablet, Rfl: 1    cetirizine (zyrTEC) 10 MG tablet, Take 1 tablet by mouth Daily., Disp: 90 tablet, Rfl: 1    clopidogrel (PLAVIX) 75 MG tablet, TAKE 1 TABLET BY MOUTH DAILY, Disp: 90 tablet, Rfl: 1    diphenhydrAMINE (BENADRYL) 25 mg capsule, Take 1 capsule by mouth Every 6 (Six) Hours As Needed for Itching. START NIGHT PRIOR TO CT EXAM, AND ALSO 30 MINUTES PRIOR TO CONTRAST, Disp: 10 capsule, Rfl: 0    donepezil (Aricept) 10 MG tablet, Take 1 tablet by mouth Every Night., Disp: 90 tablet, Rfl: 1    Eliquis 5 MG tablet tablet, Take 1 tablet by mouth Every 12 (Twelve) Hours., Disp: , Rfl:     folic acid (FOLVITE) 400 MCG tablet, Take 1 tablet by mouth Daily., Disp: 90 tablet, Rfl: 1    glucose blood test strip, 1 each by Other route 3 (Three) Times a Day. Use as instructed, Disp: 100 each, Rfl: 12    glucose monitor monitoring kit, 1 each 3 (Three) Times a Day., Disp: 1 each, Rfl: 0    hydroCHLOROthiazide (HYDRODIURIL) 25 MG tablet, Take 0.5 tablets by mouth Daily., Disp: 45 tablet, Rfl: 2    HYDROcodone-acetaminophen (NORCO)  MG per tablet, Take 1 tablet by mouth Every 6 (Six) Hours As Needed., Disp: , Rfl:     Lancets misc, 1 Device 3 (Three) Times a Day., Disp: 100 each, Rfl: 12    lansoprazole (PREVACID) 30 MG capsule, TAKE 1 CAPSULE BY MOUTH DAILY, Disp: 90 capsule, Rfl: 0    losartan (COZAAR) 25 MG tablet, Take 1 tablet by mouth Daily., Disp: , Rfl:     Magnesium 400 MG tablet, Take 1 tablet by mouth Daily., Disp:  , Rfl:     metFORMIN (GLUCOPHAGE) 500 MG tablet, TAKE 1 TABLET BY MOUTH TWICE A DAY WITH A MEAL, Disp: 60 tablet, Rfl: 2    montelukast (SINGULAIR) 10 MG tablet, Take 1 tablet by mouth Every Night., Disp: , Rfl:     nitroglycerin (NITROSTAT) 0.4 MG SL tablet, DISSOLVE 1 TAB UNDER TONGUE FOR CHEST PAIN - IF PAIN REMAINS AFTER 5 MIN, CALL 911 AND REPEAT DOSE. MAX 3 TABS IN 15 MINUTES, Disp: 25 tablet, Rfl: 0    pravastatin (PRAVACHOL) 40 MG tablet, Take 1 tablet by mouth Daily., Disp: 90 tablet, Rfl: 0    ranolazine (RANEXA) 500 MG 12 hr tablet, Take 1 tablet by mouth 2 (Two) Times a Day., Disp: 60 tablet, Rfl: 2    tamsulosin (FLOMAX) 0.4 MG capsule 24 hr capsule, TAKE 1 CAPSULE BY MOUTH DAILY, Disp: 90 capsule, Rfl: 3    Umeclidinium-Vilanterol (Anoro Ellipta) 62.5-25 MCG/ACT aerosol powder  inhaler, Inhale 1 puff Daily., Disp: , Rfl:     Lab Results:   No visits with results within 3 Month(s) from this visit.   Latest known visit with results is:   Lab on 10/07/2024   Component Date Value Ref Range Status    Ammonia 10/07/2024 <10 (L)  16 - 60 umol/L Final    Vitamin B-12 10/07/2024 525  211 - 946 pg/mL Final    Folate 10/07/2024 4.36 (L)  4.78 - 24.20 ng/mL Final    Methylmalonic Acid 10/07/2024 126  0 - 378 nmol/L Final    Lead 10/07/2024 1.1  0.0 - 3.4 ug/dL Final    Testing performed by Inductively coupled plasma/Mass Spectrometry.                            Environmental Exposure:                             WHO Recommendation     <5.0                            Occupational Exposure:                             OSHA Lead Std          40.0                             ULISES                    30.0                                  Detection Limit =  1.0    Arsenic 10/07/2024 2  0 - 9 ug/L Final                                    Detection Limit = 1    Mercury 10/07/2024 <1.0  0.0 - 14.9 ug/L Final                                    Detection Limit =  1.0    Cadmium 10/07/2024 <0.5  0.0 - 1.2 ug/L Final                                Environmental Exposure:                              Nonsmokers      0.3 - 1.2                              Smokers         0.6 - 3.9                             Occupational Exposure:                              OSHA Cadmium Std      5.0                              ULISES                   5.0                                  Detection Limit = 0.5    RPR 10/07/2024 Non-Reactive  Non-Reactive Final    TSH 10/07/2024 1.360  0.270 - 4.200 uIU/mL Final       Mental Status Exam:   Hygiene:   good  Cooperation:  Cooperative  Eye Contact:  Good  Psychomotor Behavior:  Appropriate  Mood:anxious and dysthymic  Affect:  Appropriate  Hopelessness: Denies  Speech:  Normal  Thought Process:  Goal directed  Thought Content:  Normal  Suicidal:  None  Homicidal:  None  Hallucinations:  None  Delusion:  None  Memory:  Intact  Orientation:  Person, Place, Time, and Situation  Reliability:  good  Insight:  Good  Judgement:  Good  Impulse Control:  Good    PHQ-9 Depression Screening  Little interest or pleasure in doing things? (Patient-Rptd) Several days   Feeling down, depressed, or hopeless? (Patient-Rptd) Several days   PHQ-2 Total Score (Patient-Rptd) 2   Trouble falling or staying asleep, or sleeping too much? (Patient-Rptd) Several days   Feeling tired or having little energy? (Patient-Rptd) Over half   Poor appetite or overeating? (Patient-Rptd) Not at all   Feeling bad about yourself - or that you are a failure or have let yourself or your family down? (Patient-Rptd) Not at all   Trouble concentrating on things, such as reading the newspaper or watching television? (Patient-Rptd) Several days   Moving or speaking so slowly that other people could have noticed? Or the opposite - being so fidgety or restless that you have been moving around a lot more than usual? (Patient-Rptd) Not at all   Thoughts that you would be better off dead, or of hurting yourself in some way? (Patient-Rptd) Not at all   PHQ-9 Total  Score (Patient-Rptd) 6   If you checked off any problems, how difficult have these problems made it for you to do your work, take care of things at home, or get along with other people? (Patient-Rptd) Somewhat difficult         Assessment/Plan:  Diagnoses and all orders for this visit:    1. Generalized anxiety disorder (Primary)  -     ALPRAZolam (XANAX) 0.5 MG tablet; Take 1 tablet by mouth 2 (Two) Times a Day As Needed for Anxiety.  Dispense: 60 tablet; Refill: 2  -     citalopram (CeleXA) 40 MG tablet; Take 1 tablet by mouth Daily.  Dispense: 30 tablet; Refill: 6      Patient has had some depressive symptoms since last visit related to winter weather ad cold temperatures, however his mood is good today and he has been able to manage symptoms with his medications. We will continue with current   medication dosages and follow up in 3 months.    A psychological evaluation was conducted in order to assess past and current level of functioning. Areas assessed included, but were not limited to: perception of social support, perception of ability to face and deal with challenges in life (positive functioning), anxiety symptoms, depressive symptoms, perspective on beliefs/belief system, coping skills for stress, intelligence level,  Therapeutic rapport was established. Interventions conducted today were geared towards incorporating medication management along with support for continued therapy. Education was also provided as to the med management with this provider and what to expect in subsequent sessions.    We discussed risks, benefits,goals and side effects of the above medication and the patient was agreeable with the plan.Patient was educated on the importance of compliance with treatment and follow-up appointments. Patient is aware to contact the [facility] [phone] with any worsening of symptoms. To call for questions or concerns and return early if necessary. Patent is  agreeable to go to the Emergency Department or call 911 should they begin SI/HI.     Part of this note may be an electronic transcription/translation of spoken language to printed text using the Dragon Dictation System.    Return in about 3 months (around 4/21/2025) for Follow Up 30 min, Video visit.    Ophelia Dugan, SABIHA    This note has been transcribed by Gretel CONTEH, RN [facility] NP student. I have reviewed the note and concur with the transcription.

## 2025-04-22 ENCOUNTER — TELEPHONE (OUTPATIENT)
Dept: PSYCHIATRY | Facility: CLINIC | Age: 70
End: 2025-04-22

## 2025-04-22 NOTE — TELEPHONE ENCOUNTER
Unable to reach patient by phone or lvm. I have sent a my chart message in regards to no show appointment. Requested that patient call the office back to reschedule missed appointment.

## 2025-04-28 DIAGNOSIS — F41.1 GENERALIZED ANXIETY DISORDER: ICD-10-CM

## 2025-04-28 RX ORDER — ALPRAZOLAM 0.5 MG
0.5 TABLET ORAL 2 TIMES DAILY PRN
Qty: 60 TABLET | Refills: 0 | Status: SHIPPED | OUTPATIENT
Start: 2025-04-28 | End: 2025-04-29 | Stop reason: SDUPTHER

## 2025-04-29 ENCOUNTER — TELEMEDICINE (OUTPATIENT)
Dept: PSYCHIATRY | Facility: CLINIC | Age: 70
End: 2025-04-29
Payer: MEDICARE

## 2025-04-29 DIAGNOSIS — F41.1 GENERALIZED ANXIETY DISORDER: Primary | ICD-10-CM

## 2025-04-29 DIAGNOSIS — Z51.81 ENCOUNTER FOR THERAPEUTIC DRUG LEVEL MONITORING: ICD-10-CM

## 2025-04-29 RX ORDER — ALPRAZOLAM 0.5 MG
0.5 TABLET ORAL 2 TIMES DAILY PRN
Qty: 60 TABLET | Refills: 2 | Status: SHIPPED | OUTPATIENT
Start: 2025-04-29

## 2025-04-29 RX ORDER — CITALOPRAM HYDROBROMIDE 40 MG/1
40 TABLET ORAL DAILY
Qty: 30 TABLET | Refills: 6 | Status: SHIPPED | OUTPATIENT
Start: 2025-04-29

## 2025-04-29 NOTE — PROGRESS NOTES
Patient Name: Louie Zamora  MRN: [id number]   :  1955     This provider is located at her home office through the [facility] (through [facility]), [address], [address] using a secure [employer] Video Visit through [employer]. Patient is being seen remotely via telehealth at their home address in [place], and stated they are in a secure environment for this session. The patient's condition being diagnosed/treated is appropriate for telemedicine. The provider identified herself as well as her credentials.   The patient, and/or patients guardian, consent to be seen remotely, and when consent is given they understand that the consent allows for patient identifiable information to be sent to a third party as needed.   They may refuse to be seen remotely at any time. The electronic data is encrypted and password protected, and the patient and/or guardian has been advised of the potential risks to privacy not withstanding such measures.    You have chosen to receive care through a telehealth visit.  Do you consent to use a video/audio connection for your medical care today? Yes ATTEMPTED VIDEO. WAS UNABLE TO CONNECT. USED TELEPHONE.    Chief Complaint:      ICD-10-CM ICD-9-CM   1. Generalized anxiety disorder  F41.1 300.02   2. Encounter for therapeutic drug level monitoring  Z51.81 V58.83       History of Present Illness: Louie Zamora is a 70 y.o. male is here today for medication management follow up.  Patient states anxiety has been stable.  Sleep is good now.  Last Tuesday got into an accident that required stitches.    The following portions of the patient's history were reviewed and updated as appropriate: allergies, current medications, past family history, past medical history, past social history, past surgical history, and problem list.    Review of Systems;;  Review of Systems   Constitutional:  Negative for activity change, appetite change, fatigue,  unexpected weight gain and unexpected weight loss.   Respiratory:  Negative for shortness of breath and wheezing.    Gastrointestinal:  Negative for constipation, diarrhea, nausea and vomiting.   Musculoskeletal:  Negative for gait problem.   Skin:  Negative for dry skin and rash.   Neurological:  Negative for dizziness, speech difficulty, weakness, light-headedness, headache, memory problem and confusion.   Psychiatric/Behavioral:  Negative for agitation, behavioral problems, decreased concentration, dysphoric mood, hallucinations, self-injury, sleep disturbance, suicidal ideas, negative for hyperactivity, depressed mood and stress. The patient is not nervous/anxious.        Physical Exam;;  Physical Exam  Constitutional:       General: He is not in acute distress.     Appearance: He is well-developed. He is not diaphoretic.   HENT:      Head: Normocephalic and atraumatic.   Eyes:      Conjunctiva/sclera: Conjunctivae normal.   Pulmonary:      Effort: Pulmonary effort is normal. No respiratory distress.   Musculoskeletal:         General: Normal range of motion.      Cervical back: Full passive range of motion without pain and normal range of motion.   Neurological:      Mental Status: He is alert and oriented to person, place, and time.   Psychiatric:         Attention and Perception: Attention and perception normal.         Mood and Affect: Mood and affect normal. Mood is not anxious or depressed. Affect is not labile, blunt, angry or inappropriate.         Speech: Speech normal. Speech is not rapid and pressured or tangential.         Behavior: Behavior normal. Behavior is not agitated, slowed, aggressive, withdrawn, hyperactive or combative. Behavior is cooperative.         Thought Content: Thought content normal. Thought content is not paranoid or delusional. Thought content does not include homicidal or suicidal ideation. Thought content does not include homicidal or suicidal plan.         Cognition and  Memory: Cognition and memory normal.         Judgment: Judgment normal.       There were no vitals taken for this visit.  There is no height or weight on file to calculate BMI.Video appt unable to obtain.      Current Medications;;    Current Outpatient Medications:     ALPRAZolam (XANAX) 0.5 MG tablet, Take 1 tablet by mouth 2 (Two) Times a Day As Needed for Anxiety., Disp: 60 tablet, Rfl: 2    citalopram (CeleXA) 40 MG tablet, Take 1 tablet by mouth Daily., Disp: 30 tablet, Rfl: 6    albuterol sulfate  (90 Base) MCG/ACT inhaler, Inhale 2 puffs Every 4 (Four) Hours As Needed for Wheezing., Disp: 18 g, Rfl: 0    carvedilol (COREG) 25 MG tablet, TAKE 1 TABLET BY MOUTH TWICE A DAY WITH A MEAL, Disp: 180 tablet, Rfl: 1    cetirizine (zyrTEC) 10 MG tablet, Take 1 tablet by mouth Daily., Disp: 90 tablet, Rfl: 1    clopidogrel (PLAVIX) 75 MG tablet, TAKE 1 TABLET BY MOUTH DAILY, Disp: 90 tablet, Rfl: 1    diphenhydrAMINE (BENADRYL) 25 mg capsule, Take 1 capsule by mouth Every 6 (Six) Hours As Needed for Itching. START NIGHT PRIOR TO CT EXAM, AND ALSO 30 MINUTES PRIOR TO CONTRAST, Disp: 10 capsule, Rfl: 0    donepezil (Aricept) 10 MG tablet, Take 1 tablet by mouth Every Night., Disp: 90 tablet, Rfl: 1    Eliquis 5 MG tablet tablet, Take 1 tablet by mouth Every 12 (Twelve) Hours., Disp: , Rfl:     folic acid (FOLVITE) 400 MCG tablet, Take 1 tablet by mouth Daily., Disp: 90 tablet, Rfl: 1    glucose blood test strip, 1 each by Other route 3 (Three) Times a Day. Use as instructed, Disp: 100 each, Rfl: 12    glucose monitor monitoring kit, 1 each 3 (Three) Times a Day., Disp: 1 each, Rfl: 0    hydroCHLOROthiazide (HYDRODIURIL) 25 MG tablet, Take 0.5 tablets by mouth Daily., Disp: 45 tablet, Rfl: 2    HYDROcodone-acetaminophen (NORCO)  MG per tablet, Take 1 tablet by mouth Every 6 (Six) Hours As Needed., Disp: , Rfl:     Lancets misc, 1 Device 3 (Three) Times a Day., Disp: 100 each, Rfl: 12    lansoprazole  (PREVACID) 30 MG capsule, TAKE 1 CAPSULE BY MOUTH DAILY, Disp: 90 capsule, Rfl: 0    losartan (COZAAR) 25 MG tablet, Take 1 tablet by mouth Daily., Disp: , Rfl:     Magnesium 400 MG tablet, Take 1 tablet by mouth Daily., Disp: , Rfl:     metFORMIN (GLUCOPHAGE) 500 MG tablet, TAKE 1 TABLET BY MOUTH TWICE A DAY WITH A MEAL, Disp: 60 tablet, Rfl: 2    montelukast (SINGULAIR) 10 MG tablet, Take 1 tablet by mouth Every Night., Disp: , Rfl:     nitroglycerin (NITROSTAT) 0.4 MG SL tablet, DISSOLVE 1 TAB UNDER TONGUE FOR CHEST PAIN - IF PAIN REMAINS AFTER 5 MIN, CALL 911 AND REPEAT DOSE. MAX 3 TABS IN 15 MINUTES, Disp: 25 tablet, Rfl: 0    pravastatin (PRAVACHOL) 40 MG tablet, Take 1 tablet by mouth Daily., Disp: 90 tablet, Rfl: 0    ranolazine (RANEXA) 500 MG 12 hr tablet, Take 1 tablet by mouth 2 (Two) Times a Day., Disp: 60 tablet, Rfl: 2    tamsulosin (FLOMAX) 0.4 MG capsule 24 hr capsule, TAKE 1 CAPSULE BY MOUTH DAILY, Disp: 90 capsule, Rfl: 3    Umeclidinium-Vilanterol (Anoro Ellipta) 62.5-25 MCG/ACT aerosol powder  inhaler, Inhale 1 puff Daily., Disp: , Rfl:     Lab Results:   No visits with results within 3 Month(s) from this visit.   Latest known visit with results is:   Lab on 10/07/2024   Component Date Value Ref Range Status    Ammonia 10/07/2024 <10 (L)  16 - 60 umol/L Final    Vitamin B-12 10/07/2024 525  211 - 946 pg/mL Final    Folate 10/07/2024 4.36 (L)  4.78 - 24.20 ng/mL Final    Methylmalonic Acid 10/07/2024 126  0 - 378 nmol/L Final    Lead 10/07/2024 1.1  0.0 - 3.4 ug/dL Final    Testing performed by Inductively coupled plasma/Mass Spectrometry.                            Environmental Exposure:                             WHO Recommendation     <5.0                            Occupational Exposure:                             OSHA Lead Std          40.0                             ULISES                    30.0                                  Detection Limit =  1.0    Arsenic 10/07/2024 2  0 - 9 ug/L  Final                                    Detection Limit = 1    Mercury 10/07/2024 <1.0  0.0 - 14.9 ug/L Final                                    Detection Limit =  1.0    Cadmium 10/07/2024 <0.5  0.0 - 1.2 ug/L Final                               Environmental Exposure:                              Nonsmokers      0.3 - 1.2                              Smokers         0.6 - 3.9                             Occupational Exposure:                              OSHA Cadmium Std      5.0                              ULISES                   5.0                                  Detection Limit = 0.5    RPR 10/07/2024 Non-Reactive  Non-Reactive Final    TSH 10/07/2024 1.360  0.270 - 4.200 uIU/mL Final       Mental Status Exam:   Hygiene:   good  Cooperation:  Cooperative  Eye Contact:  Good  Psychomotor Behavior:  Appropriate  Mood:within normal limits  Affect:  Appropriate  Hopelessness: Denies  Speech:  Normal  Thought Process:  Goal directed  Thought Content:  Normal  Suicidal:  None  Homicidal:  None  Hallucinations:  None  Delusion:  None  Memory:  Intact  Orientation:  Person, Place, Time, and Situation  Reliability:  good  Insight:  Good  Judgement:  Good  Impulse Control:  Good    PHQ-9 Depression Screening  Little interest or pleasure in doing things?     Feeling down, depressed, or hopeless?     PHQ-2 Total Score     Trouble falling or staying asleep, or sleeping too much?     Feeling tired or having little energy?     Poor appetite or overeating?     Feeling bad about yourself - or that you are a failure or have let yourself or your family down?     Trouble concentrating on things, such as reading the newspaper or watching television?     Moving or speaking so slowly that other people could have noticed? Or the opposite - being so fidgety or restless that you have been moving around a lot more than usual?     Thoughts that you would be better off dead, or of hurting yourself in some way?     PHQ-9 Total Score      If you checked off any problems, how difficult have these problems made it for you to do your work, take care of things at home, or get along with other people?       NEXT UDS DUE: Ordered 4/29/25 to be completed by next appt.       Assessment/Plan:  Diagnoses and all orders for this visit:    1. Generalized anxiety disorder (Primary)  -     ALPRAZolam (XANAX) 0.5 MG tablet; Take 1 tablet by mouth 2 (Two) Times a Day As Needed for Anxiety.  Dispense: 60 tablet; Refill: 2  -     citalopram (CeleXA) 40 MG tablet; Take 1 tablet by mouth Daily.  Dispense: 30 tablet; Refill: 6  -     Urine Drug Screen - Urine, Clean Catch; Future    2. Encounter for therapeutic drug level monitoring  -     Urine Drug Screen - Urine, Clean Catch; Future      Patient overall feels well.  States anxiety is stable and sleep is good.  We will continue medication as ordered and follow-up in 3 months.      We discussed risks, benefits,goals and side effects of the above medication and the patient was agreeable with the plan.Patient was educated on the importance of compliance with treatment and follow-up appointments. Patient is aware to contact the [facility] [phone] with any worsening of symptoms. To call for questions or concerns and return early if necessary. Patent is agreeable to go to the Emergency Department or call 911 should they begin SI/HI.     Part of this note may be an electronic transcription/translation of spoken language to printed text using the Dragon Dictation System.    Return in about 3 months (around 7/29/2025) for Follow Up 30 min, Video visit.    SABIHA Pantoja

## 2025-07-02 ENCOUNTER — TELEPHONE (OUTPATIENT)
Dept: NEUROLOGY | Facility: CLINIC | Age: 70
End: 2025-07-02
Payer: MEDICARE

## 2025-07-02 NOTE — TELEPHONE ENCOUNTER
I ha attempted to contact the patient and patients spouse, Brenda. No answer and no voicemail option.

## 2025-07-02 NOTE — TELEPHONE ENCOUNTER
----- Message from Ena Alejandro sent at 7/2/2025 10:54 AM EDT -----  Regarding: MRI Results Brain  Please call Louie with his recent MRI brain results; his MRI showed he had some mild atrophy with minimal microvascular change; we will discuss this further at his FU appt.

## 2025-07-29 ENCOUNTER — TELEPHONE (OUTPATIENT)
Dept: PSYCHIATRY | Facility: CLINIC | Age: 70
End: 2025-07-29

## 2025-07-29 NOTE — TELEPHONE ENCOUNTER
Attempted to contact patient via phone, seems as if it is a non working number. Sent my chart message in regards to no show appointment.

## 2025-08-01 ENCOUNTER — OUTSIDE FACILITY SERVICE (OUTPATIENT)
Dept: CARDIOLOGY | Facility: CLINIC | Age: 70
End: 2025-08-01
Payer: MEDICARE

## 2025-08-15 DIAGNOSIS — F41.1 GENERALIZED ANXIETY DISORDER: ICD-10-CM

## 2025-08-18 RX ORDER — ALPRAZOLAM 0.5 MG
0.5 TABLET ORAL 2 TIMES DAILY PRN
Qty: 60 TABLET | Refills: 2 | OUTPATIENT
Start: 2025-08-18

## 2025-08-19 DIAGNOSIS — F41.1 GENERALIZED ANXIETY DISORDER: ICD-10-CM

## 2025-08-20 LAB
AMPHETAMINES UR QL SCN: NEGATIVE NG/ML
BARBITURATES UR QL SCN: NEGATIVE NG/ML
BENZODIAZ UR QL SCN: POSITIVE NG/ML
BZE UR QL SCN: NEGATIVE NG/ML
CANNABINOIDS UR QL SCN: NEGATIVE NG/ML
CREAT UR-MCNC: 40 MG/DL (ref 20–300)
LABORATORY COMMENT REPORT: ABNORMAL
METHADONE UR QL SCN: NEGATIVE NG/ML
OPIATES UR QL SCN: POSITIVE NG/ML
OXYCODONE+OXYMORPHONE UR QL SCN: NEGATIVE NG/ML
PCP UR QL: NEGATIVE NG/ML
PH UR: 5.2 [PH] (ref 4.5–8.9)
PROPOXYPH UR QL SCN: NEGATIVE NG/ML

## 2025-08-21 RX ORDER — ALPRAZOLAM 0.5 MG
0.5 TABLET ORAL 2 TIMES DAILY PRN
Qty: 60 TABLET | Refills: 2 | Status: SHIPPED | OUTPATIENT
Start: 2025-08-21